# Patient Record
Sex: MALE | Race: WHITE | Employment: FULL TIME | URBAN - METROPOLITAN AREA
[De-identification: names, ages, dates, MRNs, and addresses within clinical notes are randomized per-mention and may not be internally consistent; named-entity substitution may affect disease eponyms.]

---

## 2019-05-06 ENCOUNTER — HOSPITAL ENCOUNTER (OUTPATIENT)
Dept: ULTRASOUND IMAGING | Age: 49
Discharge: HOME | End: 2019-05-06
Attending: FAMILY MEDICINE
Payer: COMMERCIAL

## 2019-05-06 DIAGNOSIS — R79.89 ABNORMAL LIVER FUNCTION TEST: ICD-10-CM

## 2019-05-06 DIAGNOSIS — R78.89 ABNORMAL BLOOD LEVEL OF LITHIUM: ICD-10-CM

## 2019-05-06 PROCEDURE — 76700 US EXAM ABDOM COMPLETE: CPT

## 2020-09-10 ENCOUNTER — HOSPITAL ENCOUNTER (OUTPATIENT)
Dept: ULTRASOUND IMAGING | Age: 50
Discharge: HOME | End: 2020-09-10
Attending: FAMILY MEDICINE
Payer: COMMERCIAL

## 2020-09-10 DIAGNOSIS — R74.8 ELEVATED AMYLASE AND LIPASE: ICD-10-CM

## 2020-09-10 PROCEDURE — 76705 ECHO EXAM OF ABDOMEN: CPT

## 2020-10-11 ENCOUNTER — APPOINTMENT (OUTPATIENT)
Dept: CT IMAGING | Age: 50
End: 2020-10-11
Attending: EMERGENCY MEDICINE
Payer: COMMERCIAL

## 2020-10-11 ENCOUNTER — HOSPITAL ENCOUNTER (EMERGENCY)
Age: 50
Discharge: HOME | End: 2020-10-12
Attending: EMERGENCY MEDICINE
Payer: COMMERCIAL

## 2020-10-11 VITALS
SYSTOLIC BLOOD PRESSURE: 146 MMHG | DIASTOLIC BLOOD PRESSURE: 82 MMHG | HEART RATE: 62 BPM | BODY MASS INDEX: 30.19 KG/M2 | OXYGEN SATURATION: 98 % | RESPIRATION RATE: 16 BRPM | HEIGHT: 71 IN | WEIGHT: 215.61 LBS | TEMPERATURE: 98.3 F

## 2020-10-11 DIAGNOSIS — R51.9 ACUTE NONINTRACTABLE HEADACHE, UNSPECIFIED HEADACHE TYPE: Primary | ICD-10-CM

## 2020-10-11 PROCEDURE — 70450 CT HEAD/BRAIN W/O DYE: CPT

## 2020-10-11 PROCEDURE — 99282 EMERGENCY DEPT VISIT SF MDM: CPT

## 2020-10-12 RX ORDER — TRAMADOL HYDROCHLORIDE 50 MG/1
50 TABLET ORAL
Qty: 12 TAB | Refills: 0 | OUTPATIENT
Start: 2020-10-12 | End: 2020-10-15

## 2020-10-12 RX ORDER — IBUPROFEN 600 MG/1
600 TABLET ORAL
Qty: 20 TAB | Refills: 0 | Status: SHIPPED | OUTPATIENT
Start: 2020-10-12

## 2020-10-12 NOTE — ED PROVIDER NOTES
EMERGENCY DEPARTMENT HISTORY AND PHYSICAL EXAM      Date: 10/11/2020  Patient Name: Diego Villavicencio    History of Presenting Illness     Chief Complaint   Patient presents with   [facility] Referral / Consult     Patient stated that he started having a headache around 0200 this morning. It has been consistent all day and isn't getting better. He stated that he thought he had too much caffeine or that he was grinding his teeth in his sleep. Since it didn't go away, he went to [facility] and was told to come here to be evaluated for stroke. Stroke scale negative. History Provided By: Patient    HPI: Diego Villavicencio, 47 y.o. male with no significant past medical history presents to the ED with chief complaint of right-sided head pain that started this morning at about 2 AM and is been persistent throughout the day. Patient reports that his headache started at 2 AM and he thinks he may have been grinding his teeth late at night. It feels like \"electric shocks on the right upper side of his face. He denies any dental pain. Patient reports that he got both parts of his shingles vaccine earlier this year. He denies any rash. He reported some blurry vision earlier today but states that has resolved. He also reported some numbness and tingling in both of his hands which is also resolved by the time of my exam.  He denies any facial droop or slurred speech. He denies any weakness of his upper or lower extremities. [facility] and [facility] him to the ED for evaluation for possible stroke. PCP: Sowmya Mckeon, DO    No current facility-administered medications on file prior to encounter. No current outpatient medications on file prior to encounter. Past History     Past Medical History:  No past medical history on file. Past Surgical History:  No past surgical history on file. Family History:  No family history on file.     Social History:  Social History     Tobacco Use  Smoking status: Not on file   Substance Use Topics    Alcohol use: Not on file    Drug use: Not on file       Allergies: Allergies   Allergen Reactions    Sulfa (Sulfonamide Antibiotics) Hives         Review of Systems   Review of Systems   Constitutional: Negative for chills and fever. HENT: Negative for congestion, ear pain, rhinorrhea and sore throat. Eyes: Positive for visual disturbance ( Resolved at the time of my interview). Respiratory: Negative for cough, chest tightness, shortness of breath and wheezing. Cardiovascular: Negative for chest pain, palpitations and leg swelling. Gastrointestinal: Negative for diarrhea, nausea and vomiting. Genitourinary: Negative for dysuria, flank pain and hematuria. Musculoskeletal: Negative for back pain and myalgias. Skin: Negative for rash and wound. Neurological: Positive for numbness and headaches. Negative for dizziness, syncope, facial asymmetry, speech difficulty, weakness and light-headedness. Psychiatric/Behavioral: Negative for confusion. The patient is nervous/anxious. All other systems reviewed and are negative.         Physical Exam    General appearance - well nourished, well appearing, and in no distress  Eyes - pupils equal and reactive, extraocular eye movements intact  ENT - mucous membranes moist, pharynx normal without lesions, tender to touch on the right forehead and right upper cheek, TMs clear bilaterally, no vesicular lesions in auditory canal or on scalp behind the hairline  Neck - supple, no significant adenopathy; non-tender to palpation  Chest - clear to auscultation, no wheezes, rales or rhonchi; non-tender to palpation  Heart - normal rate and regular rhythm, S1 and S2 normal, no murmurs noted  Abdomen - soft, nontender, nondistended, no masses or organomegaly  Musculoskeletal - no joint tenderness, deformity or swelling; normal ROM  Extremities - peripheral pulses normal, no pedal edema  Skin - normal coloration and turgor, no rashes  Neurological - alert, oriented x3, normal speech, no focal findings or movement disorder noted    Diagnostic Study Results     Labs -   No results found for this or any previous visit (from the past 12 hour(s)). Radiologic Studies -   CT HEAD WO CONT   Final Result   IMPRESSION:       No acute intracranial abnormality. CT Results  (Last 48 hours)               10/11/20 2254  CT HEAD WO CONT Final result    Impression:  IMPRESSION:        No acute intracranial abnormality. Narrative:  EXAM:  CT HEAD WO CONT       INDICATION: Headache. COMPARISON: None       TECHNIQUE: Noncontrast head CT. Coronal and sagittal reformats. CT dose   reduction was achieved through use of a standardized protocol tailored for this   examination and automatic exposure control for dose modulation. FINDINGS: The ventricles and sulci are age-appropriate without hydrocephalus. There is no mass effect or midline shift. There is no intracranial hemorrhage or   extra-axial fluid collection. There is no abnormal parenchymal attenuation. The   gray-white matter differentiation is maintained. The basal cisterns are patent. The osseous structures are intact. The visualized paranasal sinuses and mastoid   air cells are clear. CXR Results  (Last 48 hours)    None            Medical Decision Making   I am the first provider for this patient. I reviewed the vital signs, available nursing notes, past medical history, past surgical history, family history and social history. Vital Signs-Reviewed the patient's vital signs.   Patient Vitals for the past 12 hrs:   Temp Pulse Resp BP SpO2   10/11/20 2213 98.3 °F (36.8 °C) 62 16 (!) 146/82 98 %           Records Reviewed: Nursing Notes and Old Medical Records    Provider Notes (Medical Decision Making):   Differential diagnosis: Atypical migraine, trigeminal neuralgia, zoster, muscle spasm  We will check head CT given vague neurologic complaints of visual disturbance and tingling in hands which have both resolved. My suspicion for a central process is extremely low given the fact that symptoms involved both eyes and both hands and now have completely resolved. ED Course:   Initial assessment performed. The patients presenting problems have been discussed, and they are in agreement with the care plan formulated and outlined with them. I have encouraged them to ask questions as they arise throughout their visit. Progress Notes:   Head CT is negative. Patient instructed to be on the alert for possible vesicular rash which would suggest zoster. Encouraged to follow-up with PCP and return to ED for worsening symptoms. Disposition:  Discharge home    PLAN:  1. Current Discharge Medication List      START taking these medications    Details   traMADoL (Ultram) 50 mg tablet Take 1 Tab by mouth every six (6) hours as needed for Pain for up to 3 days. Max Daily Amount: 200 mg. Qty: 12 Tab, Refills: 0    Associated Diagnoses: Acute nonintractable headache, unspecified headache type      ibuprofen (MOTRIN) 600 mg tablet Take 1 Tab by mouth every six (6) hours as needed for Pain. Qty: 20 Tab, Refills: 0           2. Follow-up Information     Follow up With Specialties Details Why Contact Info    [facility] EMERGENCY DEPT Emergency Medicine  If symptoms worsen [address] [place] [address]  [phone]    Jordin Bush, DO Family Medicine Schedule an appointment as soon as possible for a visit  [address] [facility] [address] [phone]          Return to ED if worse     Diagnosis     Clinical Impression:   1.  Acute nonintractable headache, unspecified headache type

## 2020-10-12 NOTE — ED NOTES
Provider at the bedside talking with patient to evaluate for stroke. MD is not going to call a code S at this time. She gave a verbal order for CT without contrast to be done.

## 2020-10-12 NOTE — DISCHARGE INSTRUCTIONS

## 2022-03-15 ENCOUNTER — CLINICAL SUPPORT (OUTPATIENT)
Dept: DIABETES SERVICES | Age: 52
End: 2022-03-15
Payer: COMMERCIAL

## 2022-03-15 DIAGNOSIS — E11.65 TYPE 2 DIABETES MELLITUS WITH HYPERGLYCEMIA, UNSPECIFIED WHETHER LONG TERM INSULIN USE (HCC): Primary | ICD-10-CM

## 2022-03-15 PROCEDURE — G0108 DIAB MANAGE TRN  PER INDIV: HCPCS

## 2022-03-18 NOTE — PROGRESS NOTES
[employer] [facility] for Diabetes Health  Diabetes Self-Management Education & Support Program  Pre-program Assessment    Reason for Referral: DSMES  Referral Source: Ralph Tobar DO  Services requested: DSMES and MNT    ASSESSMENT    From my perspective, the participant would benefit from [facility] specifically related to Reducing risks, Healthy eating, Monitoring, Physical activity, Taking medications, Healthy coping and Problem solving. Will adapt DSMES program to build on participant's strengths in skills score, strengths in confidence score and strengths in preparedness score as noted in the Diabetes Skills, Confidence, and Preparedness Index. During the program, we will focus on providing DSMES that specifically addresses participant's interest in Reducing risks, Healthy eating, Monitoring, Physical activity, Taking medications, Healthy coping and Problem solving, as shown by their reported readiness to change. The participant would be best served by attending weekly group class series. Diabetes Self-Management Education Follow-up Visit: 4-6-2022       Clinical Presentation  Nawaf Edwards is a 45 y.o. White male referred for diabetes self-management education. Participant has Type 2 DM on insulin for 1-10 years. Family history positive for diabetes. Patient reports receiving DSMES services in the past. Most recent A1c value: 11.4 on 12/11/2021 per provider referral    Diabetes-related medical history: none reported    Diabetes-related medications:  Current dosing:   Key Antihyperglycemic Medications     Patient is on no antihyperglycemic meds. Mr. Redd Connolly reported taking metformin 500 mg 1 tab twice daily with food and Lantus insulin 22 units daily     Blood Pressure Management  Key ACE/ARB Medications     Patient is on no ACE or ARB meds. Lipid Management  Key Antihyperlipidemia Meds     The patient is on no antihyperlipidemia meds.           Clot Prevention  Key Anti-Platelet Anticoagulant Meds     The patient is on no antiplatelet meds or anticoagulants. Learning Assessment  Learning objectives Educator assessment (3/18/2022)   Diabetes Disease Process  The participant can   A) describe diabetes in basic terms;   B) state the type of diabetes they have; &   C) state accepted blood glucose targets. Healthy Eating  The participant can   A) identify carbohydrate foods; &   B) accurately read food labels. Being Active  The participant can  A) state the benefits of physical activity;  B) report their current PA practices;  C) identify PA they would consider incorporating in their lives; &  D) develop an implementation plan. Monitoring  The participant can  A) operate their blood glucose meter; &  B) describe how they log their blood glucoses to share with their provider. Taking Medications  The participant can  A) name their diabetes medications;  B) state the purpose and dose;  C) note side effects; &  D) describe proper storage, disposal & transport (if appropriate). Healthy Coping  The participant can    A) describe their response to diabetes diagnosis; B) describe their specific coping mechanisms;  C) identify supportive people and/or other resources that positively support their diabetes self-care and health. Reducing Risks  The participant can describe the preventive measures used by providers to promote health and prevent diabetes complications. Problem Solving  The participant can   A) identify signs, symptoms & treatment of hypoglycemia;   B) identify signs, symptoms & treatment of hyperglycemia;  C) describe their sick day plan; &  D) identify BG patterns to discuss with their provider.        Yes  Yes  No        Yes  Yes        Yes  Yes  Yes  Yes        Yes  Yes          Yes  Yes  Yes  Yes      Yes  Yes  Yes        No          Yes  Yes  No  No     Characteristics to Learning   Barriers to Learning   [] Cognitive loss  [] Mental retardation   [] Intellectual delay/cognitive impairment  [] Psychiatric disorder  [] Visually impaired  [] Hearing loss                 [] Low literacy (difficulty with written text)  [] Low numeracy (difficulty with mathematical information  [] Low health literacy (difficulty with understanding health information & services  [] Language  [] Functional limitation  [] Pain   [] Financial  [] Transportation  [x] None   Favorite Ways to Learn   [] Lecture  [] Slides  [] Reading [] Video-Internet  [] Cassettes/CDs/MP3's  [x] Interactive Small Groups [] Other       Behavioral Assessment  Current self-care practices  Educator assessment (3/18/2022)   Healthy Eating  Current practices  24-hour Dietary Recall:  Breakfast: scrambled eggs, Steve's bread  Lunch: apple, blueberries, ham and cheese croissant   Dinner: cheese and crackers  Snacks: none  Beverages: coffee, water, diet soda  Alcohol: diet coke with a splash of rum     Would benefit from DSMES related to Healthy Eating: Yes      Eats a carbohydrate controlled diet: No      Stage of change: Preparation   Being Active  Current practices  How many days during the past week have you performed physical activity where your heart beats faster and your breathing is harder than normal for 30 minutes or more? 2 days    How many days in a typical week do you perform activity such as this?  2 days     Would benefit from [facility] related to Being Active: Yes      Exercises 150 minutes/week: Yes      Stage of change: Action    Mr. Inder López reported biking about 100 miles on the weekends and/or walking about an hour daily at the park. Monitoring  Current practices  Do you monitor your blood sugar? Yes    How often do you monitor? 1-2x/day    What are the range of readings? 157-344 mg/dL    Do you know your last A1c measurement? Yes    Do you know the meaning of the A1c?  Yes     Would benefit from [facility] related to Monitoring: Yes      Uses BG readings to establish trends and understand BG patterns: No      Stage of change: Preparation   Taking Medication  Current practices  Do you understand what your diabetes medications do? Yes    How often do you miss doses of your diabetes medications? sometimes misses diabetes medications    Can you afford your diabetes medications? Yes   Would benefit from [facility] related to Taking Medication: Yes      Takes medications consistently to receive full benefit: Yes      Stage of change: Action       Healthy Coping   Current state  Diabetes Skills, Confidence and Preparedness Index: Total score: 6.0  Skills: 5.7  Confidence: 5.6  Preparedness: 7.0   Would benefit from DSMES related to Healthy Coping: Yes      Identifies specific people, organizations,etc, that actively support their diabetes self-care efforts: Yes      Stage of change: Preparation     Reducing Risks  Current state  Vaccines:  Influenza: There is no immunization history on file for this patient. Pneumococcal:   There is no immunization history on file for this patient. Hepatitis:   There is no immunization history on file for this patient. Examinations:  No Diabetic HM Topics for this patient     Dental exam: Last appointment was: participant reported 3/2022    Foot exam: Last appointment was: participant reported 3/2022    Heart Protection:  BP Readings from Last 2 Encounters:   10/11/20 (!) 146/82        No results found for: LDL, LDLC, DLDLP     Kidney Protection:  No results found for: MCACR, MCA1, MCA2, MCA3, MCAU, MCAU2, MCALPOCT     Would benefit from [facility] related to Reducing Risks: Yes      Actively participates in decision-making with provider regarding secondary prevention:  Yes      Stage of change: Preparation   Problem Solving  Current state  Hypoglycemia Management:  What are signs and symptoms of hypoglycemia that you experience? Sweat/clammy skin, Nausea/queasiness    How do you prevent hypoglycemia?  Consistent meals/snack times    How do you treat hypoglycemia? eat cheese or milk    Hyperglycemia Management:  What are signs and symptoms of hyperglycemia that you experience? Extreme thirst, Frequent urination    How can you prevent hyperglycemia? Take medication as instructed, Focus on carbohydrate counting/meal planning    Sick Day Management:  What do you do differently on sick days? Pt reported being unaware of self-management on sick days    Pattern Management:  Do you notice blood glucose patterns when you look at the readings in your meter or logbook? No    How do you use the blood glucose readings from your meter or logbook? Pt reported being unaware of pattern management skills     Would benefit from [facility] related to Problem Solving: Yes      Articulates appropriate strategies to address hypoglycemia, hyperglycemia, sick day care and BG pattern: No      Stage of change: Preparation     Note: Content derived from the American Association of Diabetes Educators' Diabetes Education Curriculum: A Guide to Successful Self-Management (3rd edition)      Barbara Dugan RN on 3/18/2022 at 8:13 AM    I have personally reviewed the health record, including provider notes, laboratory data and current medications before making these care and education recommendations. The time spent in this effort is included in the total time. Total minutes: 48    Diabetes Skills, Confidence & Preparedness Index (SCPI) ©  Thank you for completing the Skills, Confidence & Preparedness Index! Below are your scores. All scales and questions are out of 7. If you would like these results emailed, please enter your email address along with some identifying patient information.   Email:    Patient Identifier:   Overall SCPI score: 6.0 Skills Score: 5.7  Low: Blood Sugar Monitoring(Q8) Confidence Score: 5.6  Low: Healthy Coping(Q2),Problem MDOXKFH(W4) Preparedness Score: 7.0  Low: Healthy Eating(Q1),Being Active(Q2),Healthy Coping(Q3),Reducing Risks(Q4),Taking Medication(Q5),Blood Sugar Monitoring(Q6),Problem ZXSENTHIL(I8)  Healthy Eating Score: 6.5  Low: Confidence(Q4) Taking Medication Score: 7.0  Low: Skills(Q2),Preparedness(Q5) Blood Sugar Monitoring Score: 6.2  Low: Skills(Q8) Reducing Risks Score: 5.8  Low: BPBBMD(R6)  Problem Solving Score: 5.3  Low: Confidence(Q7) Healthy Coping Score: 5.0  Low: Skills(Q7),Confidence(Q2) Being Active Score: 7.0  Low: Confidence(Q5),Preparedness(Q2)  Skills/Knowledge Questions  1. I know how to plan meals that have the best balance between carbohydrates, proteins and vegetables. 7  2. I know how my diabetes medications (pills, injectables and/or insulin) work in my body. 7  3. I know when to check my blood sugar if I want to see how my body responded to a meal. 7  4. I know when to check my blood sugars to determine if my medication or insulin doses are correct. 7  5. I know what to do to prevent a low blood sugar when I exercise (either before, during, or after). 7  6. When I am sick, I know what to do differently with my diabetes management. 5  7. I know how stress can affect my diabetes management. 4  8. When I look at my blood sugars over a given week, I can explain what my blood sugar pattern is. 3  9. I know what my target levels are for A1c, blood pressure and cholesterol. 4  Confidence Questions  1. I am confident that I can plan balanced meals and snacks. 7  2. I am confident that I can manage my stress. 4  3. I am confident that I can prevent a low blood sugar during or after exercise. 5  4. I am confident that the next time I eat out, I will be able to choose foods that best keep my blood sugars in target. 5  5. I am confident I can include exercise into my schedule. 7  6. I am confident that I can use my daily blood sugars to adjust my diet, my activity, and/or my insulin. 7  7. When something out of my normal routine happens, I am confident that I can problem-solve and keep my diabetes on track. 4  Preparedness Questions  1.  Within the next month, I will begin to eat more balanced meals and snacks. 8  2. Within the next month, I will choose an exercise activity and I will start fitting it into my schedule. 8  3. Within the next month, I will make a list of stress management options that work for me. 8  4. Within the next month, I will consistently plan ahead to prevent low blood sugars. 8  5. Within the next month, I will start adjusting my insulin doses on my own. 8  6. Within the next month, I will begin making changes to my diabetes management based on my daily blood sugars (eg - eating, activity and/or insulin). 8  7. Within the next month, I will begin making changes to my diabetes management to meet my overall goals (eg - eating, activity and/or insulin).  8

## 2022-04-06 ENCOUNTER — CLINICAL SUPPORT (OUTPATIENT)
Dept: DIABETES SERVICES | Age: 52
End: 2022-04-06
Payer: COMMERCIAL

## 2022-04-06 DIAGNOSIS — E11.65 TYPE 2 DIABETES MELLITUS WITH HYPERGLYCEMIA, UNSPECIFIED WHETHER LONG TERM INSULIN USE (HCC): Primary | ICD-10-CM

## 2022-04-06 PROCEDURE — G0109 DIAB MANAGE TRN IND/GROUP: HCPCS | Performed by: DIETITIAN, REGISTERED

## 2022-04-07 NOTE — PROGRESS NOTES
[address] [place] [address] for Diabetes Health  Diabetes Self-Management Education & Support Program  Encounter Note  Class 1        SUMMARY  Diabetes self-care management training was completed related to Reducing risks. The participant will return in one week to continue DSMES related to Healthy eating and Monitoring. The participant did not identify a SMART Goal today and will practice knowledge and skills related to reducing risks to improve diabetes self-management. EVALUATION:  Twin Martínez actively participated in group class discussions. RECOMMENDATIONS:  Communicate with provider regarding recommendations for immunizations/vaccines. Twin Martínez is willing to establish a personal health care record to track their diabetes management. Next provider visit is scheduled for 1 week         DATE DSMES TOPIC EVALUATION     4/6/2022 WHAT IS DIABETES?   a. Role of the normal pancreas in energy balance and blood glucose control   b. The defect seen in diabetes   c. Signs & symptoms of diabetes   d. Diagnosis of diabetes   e. Types of diabetes   f. Blood glucose targets in pregnant & non-pregnant adults       The participant knows   Their type of diabetes Yes   The basic physiologic defect Yes   Blood glucose targets yes     DATE DSMES TOPIC EVALUATION     4/6/2022 HOW DO I STAY HEALTHY?   a. Prevention    Vaccinations    Preconception care (if applicable)  b. Examinations    Eye     Dental   Foot   c. Diabetic complications' prevention    Heart health    Kidney Health    Nerve health    Sleep health      The participant has a personal diabetes care record to keep abreast of diabetes health YES    The participant would benefit from: Communicate with provider regarding recommended immunizations/vaccines.                    Gagandeep Dior RD on 4/7/2022 at 2:56 PM      I have personally reviewed the health record, including provider notes, laboratory data and current medications before making these care and education recommendations. The time spent in this effort is included in the total time.   Total minutes: 125

## 2022-04-08 ENCOUNTER — TELEPHONE (OUTPATIENT)
Dept: DIABETES SERVICES | Age: 52
End: 2022-04-08

## 2022-04-13 ENCOUNTER — CLINICAL SUPPORT (OUTPATIENT)
Dept: DIABETES SERVICES | Age: 52
End: 2022-04-13
Payer: COMMERCIAL

## 2022-04-13 DIAGNOSIS — E11.65 TYPE 2 DIABETES MELLITUS WITH HYPERGLYCEMIA, UNSPECIFIED WHETHER LONG TERM INSULIN USE (HCC): Primary | ICD-10-CM

## 2022-04-13 PROCEDURE — G0109 DIAB MANAGE TRN IND/GROUP: HCPCS

## 2022-04-14 ENCOUNTER — TELEPHONE (OUTPATIENT)
Dept: DIABETES SERVICES | Age: 52
End: 2022-04-14

## 2022-04-14 NOTE — PROGRESS NOTES
[facility] Program for Diabetes Health  Diabetes Self-Management Education & Support Program  Encounter note    SUMMARY  Diabetes self-care management education was completed related to healthy eating and monitoring. Rita Malhotra will return in 1 week to continue DSMES related to physical activity and taking medications. Rita Malhotra did identify a SMART Goal(s): increase nutrition by limiting diet sodas to 7 per day everyday this week and will practice knowledge and skills related to healthy eating and monitoring to improve diabetes self-management. EVALUATION:  Rita Malhotra actively engaged in class discussions and demonstrations including: healthy plate method utilizing food models, carbohydrate counting, reading food labels, monitoring BG techniques, recommended BG targets, CGM technology, recording and communicating BG results with providers. Rita Malhotra reported BG range: 165 - 175 mg/dL. RECOMMENDATIONS:  Rita Malhotra is willing to: work on his SMART goal  Rita Malhotra is also willing to practice building healthy meals following ADA guidelines, practice reading food labels, and will monitor and record BG results to share with provider. SMART GOAL(S)  1. Increase Nutrition by limiting diet sodas to 7 per day, everyday this week  ACHIEVEMENT OF GOAL(S)  [x] 0-24%     [] 25-49%     [] 50-74%     [] %     DATE DSMES TOPIC EVALUATION     4/13/2022 WHAT CAN I EAT?   a. General principles   b. Determining a healthy weight   c. Nutritional terms & tools    Healthy Plate method    Carbohydrate Counting    Reading food labels    Free apps   d. Pregnancy recommendations      The participant    Used Healthy Plate principles in constructing meals Yes   Read food labels in choosing acceptable foods Yes    The participant would benefit from:  Practice building healthy meals daily following ADA guidelines. Practice reading food labels.      DATE DSMES TOPIC EVALUATION     4/13/2022 HOW CAN BLOOD GLUCOSE MONITORING HELP ME?   a. Value of blood glucose monitoring   b. Realistic expectations   c. Blood glucose monitoring targets   d. Target adjustments   e. Setting a1c & blood glucose targets with provider   f. Meter selection    g. Technique for obtaining blood droplet   h. Blood glucose testing sites   i. Determining best times to test   j. Pregnancy recommendations   k. Data sharing with provider        The participant    Can demonstrate their glucometer procedure Yes   Logs their BG readings Yes    The participant would benefit from:  Monitor and record BG and communicate results with provider. I have personally reviewed the health record, including provider notes, laboratory data and current medications before making these care and   education recommendations. The time spent in this effort is included in the total time.   Total minutes: 120

## 2022-04-14 NOTE — TELEPHONE ENCOUNTER
Mr. Isra Lagunas called with concerns regarding ADA recommended vaccines and immunizations. Discussed ADA recommendations as pertains to persons with diabetes and encouraged communication with provider before obtaining vaccines/immunizations from pharmacy.

## 2022-04-20 ENCOUNTER — CLINICAL SUPPORT (OUTPATIENT)
Dept: DIABETES SERVICES | Age: 52
End: 2022-04-20
Payer: COMMERCIAL

## 2022-04-20 DIAGNOSIS — E11.65 TYPE 2 DIABETES MELLITUS WITH HYPERGLYCEMIA, UNSPECIFIED WHETHER LONG TERM INSULIN USE (HCC): Primary | ICD-10-CM

## 2022-04-20 PROCEDURE — G0109 DIAB MANAGE TRN IND/GROUP: HCPCS | Performed by: DIETITIAN, REGISTERED
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
## 2022-04-25 ENCOUNTER — OFFICE VISIT (OUTPATIENT)
Dept: ORTHOPEDIC SURGERY | Age: 52
End: 2022-04-25
Payer: COMMERCIAL

## 2022-04-25 VITALS — WEIGHT: 210 LBS | BODY MASS INDEX: 29.4 KG/M2 | HEIGHT: 71 IN

## 2022-04-25 DIAGNOSIS — S83.241A ACUTE MEDIAL MENISCUS TEAR, RIGHT, INITIAL ENCOUNTER: Primary | ICD-10-CM

## 2022-04-25 PROCEDURE — 99204 OFFICE O/P NEW MOD 45 MIN: CPT | Performed by: ORTHOPAEDIC SURGERY

## 2022-04-25 RX ORDER — ATORVASTATIN CALCIUM 20 MG/1
20 TABLET, FILM COATED ORAL DAILY
COMMUNITY
Start: 2022-02-23

## 2022-04-25 RX ORDER — FINASTERIDE 5 MG/1
5 TABLET, FILM COATED ORAL 2 TIMES DAILY
COMMUNITY
Start: 2022-02-23

## 2022-04-25 RX ORDER — AMITRIPTYLINE HYDROCHLORIDE 25 MG/1
25 TABLET, FILM COATED ORAL
COMMUNITY
Start: 2022-02-14

## 2022-04-25 RX ORDER — INSULIN GLARGINE 100 [IU]/ML
INJECTION, SOLUTION SUBCUTANEOUS
COMMUNITY
Start: 2022-02-20

## 2022-04-25 RX ORDER — DICLOFENAC SODIUM 75 MG/1
75 TABLET, DELAYED RELEASE ORAL 2 TIMES DAILY
Qty: 60 TABLET | Refills: 0 | Status: SHIPPED | OUTPATIENT
Start: 2022-04-25
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
## 2022-04-25 NOTE — LETTER
4/25/2022    Patient: Ale Ware   YOB: 1970   Date of Visit: 4/25/2022     Wilberto Bacon  [facility] [address]  Via Fax: [phone]    Dear Taz Nicole DO,      Thank you for referring Mr. Ale Ware to [facility] for evaluation. My notes for this consultation are attached. If you have questions, please do not hesitate to call me. I look forward to following your patient along with you.       Sincerely,    Carine Felipe MD

## 2022-04-27 ENCOUNTER — CLINICAL SUPPORT (OUTPATIENT)
Dept: DIABETES SERVICES | Age: 52
End: 2022-04-27
Payer: COMMERCIAL

## 2022-04-27 DIAGNOSIS — E11.65 TYPE 2 DIABETES MELLITUS WITH HYPERGLYCEMIA, UNSPECIFIED WHETHER LONG TERM INSULIN USE (HCC): Primary | ICD-10-CM

## 2022-04-27 PROCEDURE — G0109 DIAB MANAGE TRN IND/GROUP: HCPCS

## 2022-04-28 NOTE — PROGRESS NOTES
[facility] Program for Diabetes Health  Diabetes Self-Management Education & Support Program  Encounter note    SUMMARY  Diabetes self-care management training was completed related to Healthy coping and Problem solving. [facility]. The participant will practice knowledge and skills related to healthy eating, healthy coping and problem solving to improve diabetes self-management. EVALUATION:  Flaquito De Leon actively participated in group class discussions. Discussed healthy coping, problem solving techniques, obtaining support, and stress management options. Discussed and demonstrated emergency preparedness kit/plan. RECOMMENDATIONS:  Flaquito De Leon is willing to continue to work on SMART goal.     Next provider visit is scheduled for 5/3/22     SMART GOAL(S)  1. Increase Nutrition by limiting diet sodas to 7 per day, everyday this week  ACHIEVEMENT OF GOAL(S)  [] 0-24%     [x] 25-49%     [] 50-74%     [x] %     DATE [facility]     4/28/2022 HOW DO I FIND SUPPORT TO TACKLE THIS CONDITION?   a. Normal responses to diabetes diagnosis or complication    Shock    Anger & resentment    Guilt/self-blame    Sadness & worry    Depression     Anxiety    Pregnancy   b. Constructive strategies to normal responses     Exploring feelings & attitudes    Motivation: Cost versus benefits analysis    Problem-solving: Chain analysis    Obtaining support: Communicating   i. Family & friends   ii. Health team   iii.  Community   c. Stress    Symptoms    Managing stress    Fill your tool kit        The participant can identify people that support them in caring for their diabetes health:  YES    The participant would like to pursue the following in keeping themselves healthy after completing the program:  Websites, friends, counselor behavioral health, support groups    The participant would benefit from:  Practicing stress management techniques     DATE DSMES TOPIC EVALUATION     4/28/2022 HOW DO I FIGURE OUT WHAT'S INFLUENCING MY BLOOD GLUCOSES?   a. Problem solving using SOAR    Set goals    Sort options    Arrive at a plan    Reaffirm plan   b. Common problems in diabetes self-care    Hypoglycemia    Hyperglycemia    Sick days   c. Pattern management   d. Disaster preparedness plan        The participant has an action plan for    Hypoglycemia Yes   Hyperglycemia Yes   Sick days Yes   During disasters Yes    The participant would benefit from:  Practicing problem solving techniques     Tata Casper RN on 4/28/2022 at 1:06 PM    I have personally reviewed the health record, including provider notes, laboratory data and current medications before making these care and education recommendations. The time spent in this effort is included in the total time.   Total minutes: 120

## 2022-05-03 ENCOUNTER — HOSPITAL ENCOUNTER (OUTPATIENT)
Dept: MRI IMAGING | Age: 52
Discharge: HOME | End: 2022-05-03
Attending: ORTHOPAEDIC SURGERY
Payer: COMMERCIAL

## 2022-05-03 DIAGNOSIS — S83.241A ACUTE MEDIAL MENISCUS TEAR, RIGHT, INITIAL ENCOUNTER: ICD-10-CM

## 2022-05-03 PROCEDURE — 73721 MRI JNT OF LWR EXTRE W/O DYE: CPT

## 2022-05-09 ENCOUNTER — OFFICE VISIT (OUTPATIENT)
Dept: ORTHOPEDIC SURGERY | Age: 52
End: 2022-05-09

## 2022-05-09 VITALS — BODY MASS INDEX: 29.4 KG/M2 | HEIGHT: 71 IN | WEIGHT: 210 LBS

## 2022-05-09 DIAGNOSIS — S83.241A ACUTE MEDIAL MENISCUS TEAR, RIGHT, INITIAL ENCOUNTER: Primary | ICD-10-CM

## 2022-05-09 DIAGNOSIS — S83.511A COMPLETE TEAR OF RIGHT ACL, INITIAL ENCOUNTER: ICD-10-CM

## 2022-05-09 DIAGNOSIS — S83.411A TEAR OF MCL (MEDIAL COLLATERAL LIGAMENT) OF KNEE, RIGHT, INITIAL ENCOUNTER: ICD-10-CM

## 2022-05-09 PROCEDURE — 99214 OFFICE O/P EST MOD 30 MIN: CPT | Performed by: ORTHOPAEDIC SURGERY

## 2022-05-09 RX ORDER — MELOXICAM 15 MG/1
15 TABLET ORAL DAILY
Qty: 30 TABLET | Refills: 3 | Status: SHIPPED | OUTPATIENT
Start: 2022-05-09 | End: 2022-05-25

## 2022-05-09 NOTE — PATIENT INSTRUCTIONS
What to expect after ACL Reconstruction   Dr. Jaclyn Rene should not have ANYTHING to eat or drink after midnight the night before your surgery.  This includes NO no gum, mints, candy, lifesavers or lollipops!  Please make sure to remove ALL jewelry.  When you arrive at the hospital or surgery center, you will be checked in and given an IV.  You will be offered a nerve block, which I do recommend. This will be done pre-operatively by the anesthesiologist. It is an injection around your upper thigh that numbs the nerves to your leg and lasts 15  20 hours. This will give you pain relief that hopefully lasts throughout your first night.  When the nerve block wears off, you will likely be in pain. This can range from mild to severe. If you experience severe pain, this is still normal. Nothing is wrong. You would have woke up with worse pain if you did not have the nerve block!  During first three days, the pain is usually the worst, and then gradually subsides after that.  Numbness, tingling, soreness and bruising are all normal    Your knee may also be warm to touch for the first few days.  You may also experience swelling in the leg, ankle and foot. This is normal. I recommend ice and elevation    You will likely continue to have pain for several weeks or even months. Recovery from knee surgery takes several months. BE PATIENT!  You may be weight bearing as tolerated in your brace   At your first follow up appointment, you will have your sutures removed and will be given a script for physical therapy   You will be in therapy for about 8 weeks  12 weeks.  Driving:  o If you had surgery on your LEFT knee, you can begin driving when you are no longer taking narcotic pain medications.   o If you had surgery on your RIGHT knee, you should figure on starting to drive at 5 weeks post-op  - If you had a meniscal repair, this will be 6 weeks post-op   Your restrictions will be as follows: (unless otherwise directed by your physician)  o NO lifting/carrying /pushing/pulling greater than 10 lbs for 6 months  o NO kneeling/crawling/climbing  o You may begin to run at 3 months post-op   o In sports: No cutting/pivoting for 6 months  o Return to sports: 6-8 months depending on the sport  o

## 2022-05-09 NOTE — LETTER
5/9/2022    Patient: Yuridia Davis   YOB: 1970   Date of Visit: 5/9/2022     [address]  [address] [address]  Via Fax: [phone]     Marques Dalton, [address] [place] [address]  Via In Basket    Dear DO Marques Grider MD,      Thank you for referring Mr. Yuridia Davis to [facility] for evaluation. My notes for this consultation are attached. If you have questions, please do not hesitate to call me. I look forward to following your patient along with you.       Sincerely,    Kirstie Benavidez, DO
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
## 2022-05-11 ENCOUNTER — TELEPHONE (OUTPATIENT)
Dept: ORTHOPEDIC SURGERY | Age: 52
End: 2022-05-11

## 2022-05-11 DIAGNOSIS — S83.511A COMPLETE TEAR OF RIGHT ACL, INITIAL ENCOUNTER: ICD-10-CM

## 2022-05-11 DIAGNOSIS — S83.411A TEAR OF MCL (MEDIAL COLLATERAL LIGAMENT) OF KNEE, RIGHT, INITIAL ENCOUNTER: Primary | ICD-10-CM

## 2022-05-11 DIAGNOSIS — S83.241A ACUTE MEDIAL MENISCUS TEAR, RIGHT, INITIAL ENCOUNTER: ICD-10-CM

## 2022-05-16 RX ORDER — FLASH GLUCOSE SENSOR
KIT MISCELLANEOUS
COMMUNITY
Start: 2022-04-24

## 2022-05-16 RX ORDER — UBIDECARENONE 30 MG
30 CAPSULE ORAL
COMMUNITY

## 2022-05-16 RX ORDER — TESTOSTERONE 10 MG/G
GEL TOPICAL
COMMUNITY
Start: 2022-02-28

## 2022-05-16 RX ORDER — METHOCARBAMOL 500 MG/1
500 TABLET, FILM COATED ORAL 4 TIMES DAILY
COMMUNITY

## 2022-05-16 RX ORDER — METHOCARBAMOL 500 MG/1
TABLET, FILM COATED ORAL
COMMUNITY
Start: 2022-04-18 | End: 2022-05-16

## 2022-05-16 RX ORDER — ACETAMINOPHEN 500 MG
1000 TABLET ORAL
COMMUNITY

## 2022-05-16 RX ORDER — ASPIRIN 81 MG/1
81 TABLET ORAL DAILY
COMMUNITY

## 2022-05-16 RX ORDER — EZETIMIBE 10 MG/1
10 TABLET ORAL DAILY
COMMUNITY

## 2022-05-16 NOTE — PERIOP NOTES
[facility]  Ambulatory Surgery Unit  Pre-operative Instructions    Surgery/Procedure Date  Wednesday May 25th            Tentative Arrival Time TBD      1. On the day of your surgery/procedure, please report to the Ambulatory Surgery Unit Registration Desk and sign in at your designated time. The Ambulatory Surgery Unit is located in [facility] on the [facility] side of the [facility] across from the [address] [place] [address]. Please have all of your health insurance cards and a photo ID.    **TWO adults may accompany you the day of the procedure. We have limited seating available. If our waiting room is at capacity, your ride may be asked to remain in their vehicle. No one under 15 is allowed in the waiting room. Masks, fully covering the mouth and nose, are required in the waiting room. 2. You must have someone with you to drive you home, as you should not drive a car for 24 hours following anesthesia. Please make arrangements for a responsible adult friend or family member to stay with you for at least the first 24 hours after your surgery. 3. Do not have anything to eat or drink (including water, gum, mints, coffee, juice) after 11:59 PM  Tuesday May 24th. This may not apply to medications prescribed by your physician. (Please note below the special instructions with medications to take the morning of surgery, if applicable.)    4. We recommend you do not drink any alcoholic beverages for 24 hours before and after your surgery. 5. Contact your surgeons office for instructions on the following medications: non-steroidal anti-inflammatory drugs (i.e. Advil, Aleve), vitamins, and supplements. (Some surgeons will want you to stop these medications prior to surgery and others may allow you to take them)   **If you are currently taking Plavix, Coumadin, Aspirin and/or other blood-thinning agents, contact your surgeon for instructions. ** Your surgeon will partner with the physician prescribing these medications to determine if it is safe to stop or if you need to continue taking. Please do not stop taking these medications without instructions from your surgeon. 6. In an effort to help prevent surgical site infection, we ask that you shower with an anti-bacterial soap (i.e. Dial/Safeguard, or the soap provided to you at your preadmission testing appointment) for 3 days prior to and on the morning of surgery, using a fresh towel after each shower. (Please begin this process with fresh bed linens.) Do not apply any lotions, powders, or deodorants after the shower on the day of your procedure. If applicable, please do not shave the operative site for 48 hours prior to surgery. 7. Wear comfortable clothes. Wear glasses instead of contacts. Do not bring any jewelry or money (other than copays or fees as instructed). Do not wear make-up, particularly mascara, the morning of your surgery. Do not wear nail polish, particularly if you are having foot /hand surgery. Wear your hair loose or down, no ponytails, buns, yanet pins or clips. All body piercings must be removed. 8. You should understand that if you do not follow these instructions your surgery may be cancelled. If your physical condition changes (i.e. fever, cold or flu) please contact your surgeon as soon as possible. 9. It is important that you be on time. If a situation occurs where you may be late, or if you have any questions or problems, please call [phone].    10. Your surgery time may be subject to change. You will receive a phone call the day prior to surgery to confirm your arrival time. 11. Pediatric patients: please bring a change of clothes, diapers, bottle/sippy cup, pacifier, etc.      Special Instructions:     Take all medications and inhalers, as prescribed, on the morning of surgery with a sip of water EXCEPT: diabetic medications      Insulin Dependent Diabetic patients: Take your diabetic medications as prescribed the day before surgery. Hold all diabetic medications the day of surgery. If you are scheduled to arrive for surgery after 8:00 AM, and your AM blood sugar is >200, please call Ambulatory Surgery. I understand a pre-operative phone call will be made to verify my surgery time. In the event that I am not available, I give permission for a message to be left on my answering service and/or with another person? Yes    Patient requested instructions to be emailed to him at [facility]. [email or web address]  Instructions scanned and sent to email per pt request         ___________________      ___________________      ________________  (Signature of Patient)          (Witness)                   (Date and Time)

## 2022-05-24 ENCOUNTER — ANESTHESIA EVENT (OUTPATIENT)
Dept: SURGERY | Age: 52
End: 2022-05-24
Payer: COMMERCIAL

## 2022-05-24 NOTE — PERIOP NOTES
Patient called requesting arrival time, per pre op arrival time is 1345. Notified patient and reviewed instructions and medications as well from initial phone call. [phone] - patient called, asking about what else he may need to do before surgery, asking about how long procedure will be, asking for temporary handicap sign for vehicle. Explained to patient what needs to do and, made aware that if everything goes on time and surgeon is on time, procedure will be 1.5 hours and then give or take 1 hour in recovery room and that person coming with patient will need to stay for entire length of procedure. Explained that handicap sign will need to come from an order from Dr Bolye Client and pt would need to go to [facility] for that.

## 2022-05-25 ENCOUNTER — ANESTHESIA (OUTPATIENT)
Dept: SURGERY | Age: 52
End: 2022-05-25
Payer: COMMERCIAL

## 2022-05-25 ENCOUNTER — HOSPITAL ENCOUNTER (OUTPATIENT)
Age: 52
Setting detail: OUTPATIENT SURGERY
Discharge: HOME | End: 2022-05-25
Attending: ORTHOPAEDIC SURGERY | Admitting: ORTHOPAEDIC SURGERY
Payer: COMMERCIAL

## 2022-05-25 VITALS
DIASTOLIC BLOOD PRESSURE: 84 MMHG | BODY MASS INDEX: 29.51 KG/M2 | OXYGEN SATURATION: 94 % | HEART RATE: 69 BPM | TEMPERATURE: 97.6 F | WEIGHT: 210.8 LBS | HEIGHT: 71 IN | SYSTOLIC BLOOD PRESSURE: 127 MMHG | RESPIRATION RATE: 15 BRPM

## 2022-05-25 DIAGNOSIS — Z98.890 STATUS POST ARTHROSCOPY OF RIGHT KNEE: Primary | ICD-10-CM

## 2022-05-25 DIAGNOSIS — S83.511D RUPTURE OF ANTERIOR CRUCIATE LIGAMENT OF RIGHT KNEE, SUBSEQUENT ENCOUNTER: ICD-10-CM

## 2022-05-25 DIAGNOSIS — S83.411D COMPLETE TEAR OF MCL OF KNEE, RIGHT, SUBSEQUENT ENCOUNTER: ICD-10-CM

## 2022-05-25 LAB
GLUCOSE BLD STRIP.AUTO-MCNC: 159 MG/DL (ref 65–117)
GLUCOSE BLD STRIP.AUTO-MCNC: 179 MG/DL (ref 65–117)
SERVICE CMNT-IMP: ABNORMAL
SERVICE CMNT-IMP: ABNORMAL

## 2022-05-25 PROCEDURE — 77030006674 HC BLD MYRIN GYRS -B: Performed by: ORTHOPAEDIC SURGERY

## 2022-05-25 PROCEDURE — 74011250636 HC RX REV CODE- 250/636: Performed by: NURSE ANESTHETIST, CERTIFIED REGISTERED

## 2022-05-25 PROCEDURE — 76060000063 HC AMB SURG ANES 1.5 TO 2 HR: Performed by: ORTHOPAEDIC SURGERY

## 2022-05-25 PROCEDURE — 2709999900 HC NON-CHARGEABLE SUPPLY: Performed by: ORTHOPAEDIC SURGERY

## 2022-05-25 PROCEDURE — 74011250636 HC RX REV CODE- 250/636: Performed by: ANESTHESIOLOGY

## 2022-05-25 PROCEDURE — 77030002916 HC SUT ETHLN J&J -A: Performed by: ORTHOPAEDIC SURGERY

## 2022-05-25 PROCEDURE — 77030006884 HC BLD SHV INCIS S&N -B: Performed by: ORTHOPAEDIC SURGERY

## 2022-05-25 PROCEDURE — 77030019908 HC STETH ESOPH SIMS -A: Performed by: ANESTHESIOLOGY

## 2022-05-25 PROCEDURE — C1713 ANCHOR/SCREW BN/BN,TIS/BN: HCPCS | Performed by: ORTHOPAEDIC SURGERY

## 2022-05-25 PROCEDURE — 74011000250 HC RX REV CODE- 250: Performed by: NURSE ANESTHETIST, CERTIFIED REGISTERED

## 2022-05-25 PROCEDURE — 77030020268 HC MISC GENERAL SUPPLY: Performed by: ORTHOPAEDIC SURGERY

## 2022-05-25 PROCEDURE — 77030031139 HC SUT VCRL2 J&J -A: Performed by: ORTHOPAEDIC SURGERY

## 2022-05-25 PROCEDURE — 77030018834: Performed by: ORTHOPAEDIC SURGERY

## 2022-05-25 PROCEDURE — 77030018787: Performed by: ORTHOPAEDIC SURGERY

## 2022-05-25 PROCEDURE — 76210000050 HC AMBSU PH II REC 0.5 TO 1 HR: Performed by: ORTHOPAEDIC SURGERY

## 2022-05-25 PROCEDURE — 77030026438 HC STYL ET INTUB CARD -A: Performed by: ANESTHESIOLOGY

## 2022-05-25 PROCEDURE — 74011000250 HC RX REV CODE- 250: Performed by: ORTHOPAEDIC SURGERY

## 2022-05-25 PROCEDURE — 27405 REPAIR OF KNEE LIGAMENT: CPT | Performed by: ORTHOPAEDIC SURGERY

## 2022-05-25 PROCEDURE — 77030002922 HC SUT FBRWRE ARTH -B: Performed by: ORTHOPAEDIC SURGERY

## 2022-05-25 PROCEDURE — 29888 ARTHRS AID ACL RPR/AGMNTJ: CPT | Performed by: ORTHOPAEDIC SURGERY

## 2022-05-25 PROCEDURE — 74011250636 HC RX REV CODE- 250/636: Performed by: REGISTERED NURSE

## 2022-05-25 PROCEDURE — 76030000020 HC AMB SURG 1.5 TO 2 HR INTENSV-TIER 1: Performed by: ORTHOPAEDIC SURGERY

## 2022-05-25 PROCEDURE — 76210000034 HC AMBSU PH I REC 0.5 TO 1 HR: Performed by: ORTHOPAEDIC SURGERY

## 2022-05-25 PROCEDURE — 77030000032 HC CUF TRNQT ZIMM -B: Performed by: ORTHOPAEDIC SURGERY

## 2022-05-25 PROCEDURE — 74011000250 HC RX REV CODE- 250: Performed by: REGISTERED NURSE

## 2022-05-25 PROCEDURE — 74011250636 HC RX REV CODE- 250/636: Performed by: ORTHOPAEDIC SURGERY

## 2022-05-25 PROCEDURE — 77030008684 HC TU ET CUF COVD -B: Performed by: ANESTHESIOLOGY

## 2022-05-25 PROCEDURE — 77030008496 HC TBNG ARTHSC IRR S&N -B: Performed by: ORTHOPAEDIC SURGERY

## 2022-05-25 PROCEDURE — 77030002933 HC SUT MCRYL J&J -A: Performed by: ORTHOPAEDIC SURGERY

## 2022-05-25 PROCEDURE — 74011250636 HC RX REV CODE- 250/636

## 2022-05-25 PROCEDURE — 77030013789 HC KT REP BIO TEND ARTH -C: Performed by: ORTHOPAEDIC SURGERY

## 2022-05-25 PROCEDURE — 77030004451 HC BUR SHV S&N -B: Performed by: ORTHOPAEDIC SURGERY

## 2022-05-25 PROCEDURE — 77030021678 HC GLIDESCP STAT DISP VERT -B: Performed by: ANESTHESIOLOGY

## 2022-05-25 PROCEDURE — 74011250637 HC RX REV CODE- 250/637: Performed by: ANESTHESIOLOGY

## 2022-05-25 PROCEDURE — 82962 GLUCOSE BLOOD TEST: CPT

## 2022-05-25 DEVICE — DEVICE GRFT FIX 4.75X19.1 MM ANCHR IMPL BIOCOMP SWIVELOCK: Type: IMPLANTABLE DEVICE | Site: KNEE | Status: FUNCTIONAL

## 2022-05-25 DEVICE — PEEK SWIVELOCK C, 5.5X19.1MM
Type: IMPLANTABLE DEVICE | Site: KNEE | Status: FUNCTIONAL
Brand: ARTHREX®

## 2022-05-25 RX ORDER — OXYCODONE HYDROCHLORIDE 5 MG/1
5 TABLET ORAL
Qty: 28 TABLET | Refills: 0 | Status: SHIPPED | OUTPATIENT
Start: 2022-05-25 | End: 2022-06-01

## 2022-05-25 RX ORDER — GLYCOPYRROLATE 0.2 MG/ML
INJECTION INTRAMUSCULAR; INTRAVENOUS AS NEEDED
Status: DISCONTINUED | OUTPATIENT
Start: 2022-05-25 | End: 2022-05-25 | Stop reason: HOSPADM

## 2022-05-25 RX ORDER — FENTANYL CITRATE 50 UG/ML
INJECTION, SOLUTION INTRAMUSCULAR; INTRAVENOUS
Status: COMPLETED
Start: 2022-05-25 | End: 2022-05-25

## 2022-05-25 RX ORDER — SODIUM CHLORIDE, SODIUM LACTATE, POTASSIUM CHLORIDE, CALCIUM CHLORIDE 600; 310; 30; 20 MG/100ML; MG/100ML; MG/100ML; MG/100ML
25 INJECTION, SOLUTION INTRAVENOUS CONTINUOUS
Status: DISCONTINUED | OUTPATIENT
Start: 2022-05-25 | End: 2022-05-25 | Stop reason: HOSPADM

## 2022-05-25 RX ORDER — ROCURONIUM BROMIDE 10 MG/ML
INJECTION, SOLUTION INTRAVENOUS AS NEEDED
Status: DISCONTINUED | OUTPATIENT
Start: 2022-05-25 | End: 2022-05-25 | Stop reason: HOSPADM

## 2022-05-25 RX ORDER — SUCCINYLCHOLINE CHLORIDE 20 MG/ML
INJECTION INTRAMUSCULAR; INTRAVENOUS AS NEEDED
Status: DISCONTINUED | OUTPATIENT
Start: 2022-05-25 | End: 2022-05-25 | Stop reason: HOSPADM

## 2022-05-25 RX ORDER — OXYCODONE HYDROCHLORIDE 5 MG/1
TABLET ORAL
Status: DISCONTINUED
Start: 2022-05-25 | End: 2022-05-25 | Stop reason: HOSPADM

## 2022-05-25 RX ORDER — FENTANYL CITRATE 50 UG/ML
25 INJECTION, SOLUTION INTRAMUSCULAR; INTRAVENOUS
Status: COMPLETED | OUTPATIENT
Start: 2022-05-25 | End: 2022-05-25

## 2022-05-25 RX ORDER — DEXAMETHASONE SODIUM PHOSPHATE 4 MG/ML
INJECTION, SOLUTION INTRA-ARTICULAR; INTRALESIONAL; INTRAMUSCULAR; INTRAVENOUS; SOFT TISSUE AS NEEDED
Status: DISCONTINUED | OUTPATIENT
Start: 2022-05-25 | End: 2022-05-25 | Stop reason: HOSPADM

## 2022-05-25 RX ORDER — ACETAMINOPHEN 500 MG
TABLET ORAL
Status: DISCONTINUED
Start: 2022-05-25 | End: 2022-05-25 | Stop reason: HOSPADM

## 2022-05-25 RX ORDER — LIDOCAINE HYDROCHLORIDE 20 MG/ML
INJECTION, SOLUTION EPIDURAL; INFILTRATION; INTRACAUDAL; PERINEURAL AS NEEDED
Status: DISCONTINUED | OUTPATIENT
Start: 2022-05-25 | End: 2022-05-25 | Stop reason: HOSPADM

## 2022-05-25 RX ORDER — SODIUM CHLORIDE 0.9 % (FLUSH) 0.9 %
5-40 SYRINGE (ML) INJECTION EVERY 8 HOURS
Status: DISCONTINUED | OUTPATIENT
Start: 2022-05-25 | End: 2022-05-25 | Stop reason: HOSPADM

## 2022-05-25 RX ORDER — KETOROLAC TROMETHAMINE 30 MG/ML
INJECTION, SOLUTION INTRAMUSCULAR; INTRAVENOUS
Status: DISCONTINUED
Start: 2022-05-25 | End: 2022-05-25 | Stop reason: HOSPADM

## 2022-05-25 RX ORDER — ROPIVACAINE HYDROCHLORIDE 5 MG/ML
INJECTION, SOLUTION EPIDURAL; INFILTRATION; PERINEURAL
Status: COMPLETED
Start: 2022-05-25 | End: 2022-05-25

## 2022-05-25 RX ORDER — SODIUM CHLORIDE 0.9 % (FLUSH) 0.9 %
5-40 SYRINGE (ML) INJECTION AS NEEDED
Status: DISCONTINUED | OUTPATIENT
Start: 2022-05-25 | End: 2022-05-25 | Stop reason: HOSPADM

## 2022-05-25 RX ORDER — LIDOCAINE HYDROCHLORIDE 10 MG/ML
0.1 INJECTION, SOLUTION EPIDURAL; INFILTRATION; INTRACAUDAL; PERINEURAL AS NEEDED
Status: DISCONTINUED | OUTPATIENT
Start: 2022-05-25 | End: 2022-05-25 | Stop reason: HOSPADM

## 2022-05-25 RX ORDER — MIDAZOLAM HYDROCHLORIDE 1 MG/ML
INJECTION, SOLUTION INTRAMUSCULAR; INTRAVENOUS AS NEEDED
Status: DISCONTINUED | OUTPATIENT
Start: 2022-05-25 | End: 2022-05-25 | Stop reason: HOSPADM

## 2022-05-25 RX ORDER — HYDROMORPHONE HYDROCHLORIDE 1 MG/ML
.2-.5 INJECTION, SOLUTION INTRAMUSCULAR; INTRAVENOUS; SUBCUTANEOUS ONCE
Status: DISCONTINUED | OUTPATIENT
Start: 2022-05-25 | End: 2022-05-25 | Stop reason: HOSPADM

## 2022-05-25 RX ORDER — ONDANSETRON 4 MG/1
4 TABLET, FILM COATED ORAL
Qty: 30 TABLET | Refills: 0 | Status: SHIPPED | OUTPATIENT
Start: 2022-05-25

## 2022-05-25 RX ORDER — ONDANSETRON 2 MG/ML
INJECTION INTRAMUSCULAR; INTRAVENOUS AS NEEDED
Status: DISCONTINUED | OUTPATIENT
Start: 2022-05-25 | End: 2022-05-25 | Stop reason: HOSPADM

## 2022-05-25 RX ORDER — KETOROLAC TROMETHAMINE 30 MG/ML
30 INJECTION, SOLUTION INTRAMUSCULAR; INTRAVENOUS
Status: COMPLETED | OUTPATIENT
Start: 2022-05-25 | End: 2022-05-25

## 2022-05-25 RX ORDER — ACETAMINOPHEN 500 MG
1000 TABLET ORAL ONCE
Status: COMPLETED | OUTPATIENT
Start: 2022-05-25 | End: 2022-05-25

## 2022-05-25 RX ORDER — MIDAZOLAM HYDROCHLORIDE 1 MG/ML
INJECTION, SOLUTION INTRAMUSCULAR; INTRAVENOUS
Status: COMPLETED
Start: 2022-05-25 | End: 2022-05-25

## 2022-05-25 RX ORDER — OXYCODONE AND ACETAMINOPHEN 5; 325 MG/1; MG/1
1 TABLET ORAL
Status: DISCONTINUED | OUTPATIENT
Start: 2022-05-25 | End: 2022-05-25 | Stop reason: HOSPADM

## 2022-05-25 RX ORDER — HYDROMORPHONE HYDROCHLORIDE 2 MG/ML
INJECTION, SOLUTION INTRAMUSCULAR; INTRAVENOUS; SUBCUTANEOUS AS NEEDED
Status: DISCONTINUED | OUTPATIENT
Start: 2022-05-25 | End: 2022-05-25 | Stop reason: HOSPADM

## 2022-05-25 RX ORDER — OXYCODONE HYDROCHLORIDE 5 MG/1
5 TABLET ORAL ONCE
Status: COMPLETED | OUTPATIENT
Start: 2022-05-25 | End: 2022-05-25

## 2022-05-25 RX ORDER — NEOSTIGMINE METHYLSULFATE 1 MG/ML
INJECTION, SOLUTION INTRAVENOUS AS NEEDED
Status: DISCONTINUED | OUTPATIENT
Start: 2022-05-25 | End: 2022-05-25 | Stop reason: HOSPADM

## 2022-05-25 RX ORDER — ROPIVACAINE HYDROCHLORIDE 5 MG/ML
INJECTION, SOLUTION EPIDURAL; INFILTRATION; PERINEURAL
Status: COMPLETED | OUTPATIENT
Start: 2022-05-25 | End: 2022-05-25

## 2022-05-25 RX ORDER — DROPERIDOL 2.5 MG/ML
0.62 INJECTION, SOLUTION INTRAMUSCULAR; INTRAVENOUS AS NEEDED
Status: DISCONTINUED | OUTPATIENT
Start: 2022-05-25 | End: 2022-05-25 | Stop reason: HOSPADM

## 2022-05-25 RX ORDER — MORPHINE SULFATE 10 MG/ML
2 INJECTION, SOLUTION INTRAMUSCULAR; INTRAVENOUS
Status: DISCONTINUED | OUTPATIENT
Start: 2022-05-25 | End: 2022-05-25 | Stop reason: HOSPADM

## 2022-05-25 RX ORDER — DEXMEDETOMIDINE HYDROCHLORIDE 100 UG/ML
INJECTION, SOLUTION INTRAVENOUS AS NEEDED
Status: DISCONTINUED | OUTPATIENT
Start: 2022-05-25 | End: 2022-05-25 | Stop reason: HOSPADM

## 2022-05-25 RX ORDER — PROPOFOL 10 MG/ML
INJECTION, EMULSION INTRAVENOUS AS NEEDED
Status: DISCONTINUED | OUTPATIENT
Start: 2022-05-25 | End: 2022-05-25 | Stop reason: HOSPADM

## 2022-05-25 RX ORDER — DIPHENHYDRAMINE HYDROCHLORIDE 50 MG/ML
12.5 INJECTION, SOLUTION INTRAMUSCULAR; INTRAVENOUS AS NEEDED
Status: DISCONTINUED | OUTPATIENT
Start: 2022-05-25 | End: 2022-05-25 | Stop reason: HOSPADM

## 2022-05-25 RX ORDER — FENTANYL CITRATE 50 UG/ML
INJECTION, SOLUTION INTRAMUSCULAR; INTRAVENOUS AS NEEDED
Status: DISCONTINUED | OUTPATIENT
Start: 2022-05-25 | End: 2022-05-25 | Stop reason: HOSPADM

## 2022-05-25 RX ADMIN — ROCURONIUM BROMIDE 10 MG: 10 INJECTION INTRAVENOUS at 15:18

## 2022-05-25 RX ADMIN — FENTANYL CITRATE 50 MCG: 50 INJECTION, SOLUTION INTRAMUSCULAR; INTRAVENOUS at 15:32

## 2022-05-25 RX ADMIN — FENTANYL CITRATE 50 MCG: 50 INJECTION, SOLUTION INTRAMUSCULAR; INTRAVENOUS at 15:18

## 2022-05-25 RX ADMIN — ROCURONIUM BROMIDE 20 MG: 10 INJECTION INTRAVENOUS at 15:32

## 2022-05-25 RX ADMIN — ROPIVACAINE HYDROCHLORIDE 30 ML: 5 INJECTION, SOLUTION EPIDURAL; INFILTRATION; PERINEURAL at 14:50

## 2022-05-25 RX ADMIN — Medication 1000 MG: at 17:21

## 2022-05-25 RX ADMIN — DEXAMETHASONE SODIUM PHOSPHATE 8 MG: 4 INJECTION, SOLUTION INTRAMUSCULAR; INTRAVENOUS at 15:28

## 2022-05-25 RX ADMIN — GLYCOPYRROLATE 0.4 MG: 0.2 INJECTION, SOLUTION INTRAMUSCULAR; INTRAVENOUS at 16:49

## 2022-05-25 RX ADMIN — OXYCODONE HYDROCHLORIDE 5 MG: 5 TABLET ORAL at 17:39

## 2022-05-25 RX ADMIN — ONDANSETRON HYDROCHLORIDE 4 MG: 2 INJECTION, SOLUTION INTRAMUSCULAR; INTRAVENOUS at 16:51

## 2022-05-25 RX ADMIN — FENTANYL CITRATE 25 MCG: 50 INJECTION, SOLUTION INTRAMUSCULAR; INTRAVENOUS at 17:29

## 2022-05-25 RX ADMIN — KETOROLAC TROMETHAMINE 30 MG: 30 INJECTION, SOLUTION INTRAMUSCULAR at 17:22

## 2022-05-25 RX ADMIN — SODIUM CHLORIDE, POTASSIUM CHLORIDE, SODIUM LACTATE AND CALCIUM CHLORIDE: 600; 310; 30; 20 INJECTION, SOLUTION INTRAVENOUS at 16:43

## 2022-05-25 RX ADMIN — HYDROMORPHONE HYDROCHLORIDE 0.5 MG: 2 INJECTION, SOLUTION INTRAMUSCULAR; INTRAVENOUS; SUBCUTANEOUS at 16:39

## 2022-05-25 RX ADMIN — WATER 2 G: 1 INJECTION INTRAMUSCULAR; INTRAVENOUS; SUBCUTANEOUS at 15:24

## 2022-05-25 RX ADMIN — HYDROMORPHONE HYDROCHLORIDE 0.5 MG: 2 INJECTION, SOLUTION INTRAMUSCULAR; INTRAVENOUS; SUBCUTANEOUS at 15:43

## 2022-05-25 RX ADMIN — FENTANYL CITRATE 50 MCG: 50 INJECTION, SOLUTION INTRAMUSCULAR; INTRAVENOUS at 14:46

## 2022-05-25 RX ADMIN — FENTANYL CITRATE 25 MCG: 50 INJECTION, SOLUTION INTRAMUSCULAR; INTRAVENOUS at 17:37

## 2022-05-25 RX ADMIN — HYDROMORPHONE HYDROCHLORIDE 1 MG: 2 INJECTION, SOLUTION INTRAMUSCULAR; INTRAVENOUS; SUBCUTANEOUS at 16:52

## 2022-05-25 RX ADMIN — FENTANYL CITRATE 25 MCG: 50 INJECTION, SOLUTION INTRAMUSCULAR; INTRAVENOUS at 17:24

## 2022-05-25 RX ADMIN — SODIUM CHLORIDE, POTASSIUM CHLORIDE, SODIUM LACTATE AND CALCIUM CHLORIDE 25 ML/HR: 600; 310; 30; 20 INJECTION, SOLUTION INTRAVENOUS at 14:26

## 2022-05-25 RX ADMIN — SUCCINYLCHOLINE CHLORIDE 160 MG: 20 INJECTION, SOLUTION INTRAMUSCULAR; INTRAVENOUS at 15:18

## 2022-05-25 RX ADMIN — DEXMEDETOMIDINE HYDROCHLORIDE 10 MCG: 100 INJECTION, SOLUTION, CONCENTRATE INTRAVENOUS at 16:48

## 2022-05-25 RX ADMIN — DEXMEDETOMIDINE HYDROCHLORIDE 10 MCG: 100 INJECTION, SOLUTION, CONCENTRATE INTRAVENOUS at 16:51

## 2022-05-25 RX ADMIN — LIDOCAINE HYDROCHLORIDE 80 MG: 20 INJECTION, SOLUTION EPIDURAL; INFILTRATION; INTRACAUDAL; PERINEURAL at 15:18

## 2022-05-25 RX ADMIN — MIDAZOLAM HYDROCHLORIDE 4 MG: 1 INJECTION, SOLUTION INTRAMUSCULAR; INTRAVENOUS at 14:46

## 2022-05-25 RX ADMIN — PROPOFOL 200 MG: 10 INJECTION, EMULSION INTRAVENOUS at 15:18

## 2022-05-25 RX ADMIN — FENTANYL CITRATE 25 MCG: 50 INJECTION, SOLUTION INTRAMUSCULAR; INTRAVENOUS at 17:32

## 2022-05-25 RX ADMIN — Medication 3 MG: at 16:49

## 2022-05-25 NOTE — DISCHARGE INSTRUCTIONS
Devang Solares Leader  ACL Surgery: Postoperative Instructions      >>>You received Tylenol 1000mg at 5:30pm. Suggest he take his Arthritis Tylenol every 8 hours as bottle directs. Take this on schedule.    >>>You received Toradol during your surgery. You may not take any form of NSAIDS (non steroidal anti inflammatory drugs) such as Advil, Ibuprofen, Aleve, Motrin until 11:30pm.    >> You received Oxycodone 5mg at 5:45pm so do not repeat until 11:45pm.     IMPORTANT  Successful rehab after ACL surgery has been directly associated with the ability to fully straighten out your knee at the end of rehab. Following these instructions will help ensure that we do everything possible to achieve this goal. Elevation of the leg along with cold therapy is critical in the first weeks after surgery in an effort to reduce swelling. This ensures that the knee will move easily to regain motion once therapy is started the second week. · Your leg has been placed in a hinged brace after surgery for comfort and to protect your new graft. · The brace has been locked at 10 degrees to keep your leg as straight as possible after surgery   · This allows you to walk on the leg without the leg giving way and to prevent you from losing the ability to fully straighten your leg. · This brace should be worn AT ALL TIMES when you are out of bed after surgery   · This brace may be taken off when you are in bed and laying down so that you may apply the cryotherapy unit (polar care ice) over the dressing. · The optimal position of the leg after surgery is for you to be lying flat with your ankle higher than your heart , in an effort to reduce swelling.   · Place pillows under your ANKLE to allow your knee to sag backwards to keep it straight   · You may practice bending the knee with no weight on it, but should return it to straight position when you are resting & elevating  · DO NOT sit with a pillow under your knee, as this will lead you to being unable to fully straighten your leg after surgery. · If for some reason your brace becomes unlocked or loose, please contact my office for instruction on how to fix this issue. · You will come out of your brace with your physical therapist for therapy, but will not be released from your brace until you can demonstrate the ability to walk without crutches or a limp. DIET  · You may resume your regular diet once you tolerate liquids/bland food without nausea    MEDICATION  · Take the pain medication as prescribed, WITH FOOD Narcotic is Oxycodone every 6 hours,     · While taking pain medications, you may NOT operate a vehicle, heavy machinery, or appliances, or drink alcoholic beverages  · If you have an allergic reaction to the medication, stop taking it and call my office. · If you are not allergic, take one Aspirin 81 mg twice a day after eating to prevent blood clots  · Please keep in mind that constipation is a very common side effect of taking narcotic pain medication. Take precautions to prevent constipation:  · Drink plenty of water (6-8 -  8 ounce glasses a day)  · Avoid alcohol, caffeine & dairy products  · Eat plenty of fiber (fruits, vegetables & whole grains)  · Take an over the counter stool softener such as Colace or Dulcolax or Miralax    ACTIVITY  · You may practice quadriceps muscle tightening and straight leg raises several times every hour  · Please continue to move your ankle up and down and tighten and relax your calf muscles several times every hour to help reduce swelling and prevent blood clots  · Please use your crutches until your first post operative visit  · If comfortable, you may bear weight on your leg with the assistance of crutches  · It is important to continuously elevate your knee above your heart until your swelling is completely down. · Please keep ice applied to the knee for the first 72 hours or as long as pain or swelling is persists.  Do not apply ice directly to the skin, or allow water to leak on to your dressing. DRESSING CARE  · Keep the dressing clean and dry. · It is normal to get some bloody drainage through the bandage. DO NOT BE ALARMED. · If the dressing gets soaked with drainage, please reinforce with a dry sterile dressing. · Loosen the ace wrap around your knee if it becomes too tight or painful. · Remove all dressings 3 days after surgery. If there is still some wound seepage, apply a fresh STERILE gauze over the incisions and secure with tape or an ace wrap. · DO NOT TOUCH OR REMOVE THE SUTURES! SHOWERING  · You may sponge bathe for the first 72 hours, taking care to keep the dressing clean and dry. · You may remove the dressing in 3 days and shower after surgery unless told otherwise. DO NOT immerse the knee under water. DO NOT rub/scrub the incision. DO NOT apply any ointments. After showering pat the incisions dry & place new Band-Aids over the sutures. EMERGENCY/FOLLOW - UP  · Please notify my office at [phone] if you develop any fever (above 101), unexpected warmth, redness or swelling. Please call if your toes become cold, purple, numb, or there is excessive bleeding. · Please call the office within 24 hours at [phone] to schedule a follow up appointment within 7-10 days from surgery. · Narcotic pain medication refills cannot be called into your pharmacy and the prescription must be picked up at our office. No pain medication refills can be provided after 3pm on Fridays or over the weekend. TAKE NARCOTIC PAIN MEDICATIONS WITH FOOD! Narcotics tend to be constipating and we recommend taking a stool softener such as Colace or Miralax (follow package instructions). If you were given prescriptions, please review the written information on the prescribed medications. DO NOT DRIVE WHILE TAKING NARCOTIC PAIN MEDICATIONS.     CPAP PATIENTS BE SURE TO WEAR MACHINE WHENEVER NAPPING OR SLEEPING DAY/NIGHT OF SURGERY! DISCHARGE SUMMARY from Nurse    The following personal items collected during your admission are returned to you:   Dental Appliance: Dental Appliances: None  Vision: Visual Aid: Glasses  Hearing Aid:    Jewelry: Jewelry: None  Clothing: Clothing: With patient  Other Valuables: Other Valuables: Cell Phone  Valuables sent to safe:        PATIENT INSTRUCTIONS:    After General Anesthesia or Intravenous Sedation, for 24 hours or while taking prescription Narcotics:  · Someone should be with you for the next 24 hours. · For your own safety, a responsible adult must drive you home. · Limit your activities  · Recommended activity: Rest today, up with assistance today. Do not climb stairs or shower unattended for the next 24 hours. · Start with a soft bland diet and advance as tolerated (no nausea) to regular diet. · If you have a sore throat some things that may help are: fluids, warm salt water gargle, or throat lozenges. If this does not improve after several days please follow up with your family physician. · Do not drive and operate hazardous machinery  · Do not make important personal or business decisions  · Do  not drink alcoholic beverages  · If you have not urinated within 8 hours after discharge, please contact your surgeon on call. Report the following to your surgeon:  · Excessive pain, swelling, redness or odor of or around the surgical area  · Temperature over 100.5  · Nausea and vomiting lasting longer than 4 hours or if unable to take medications  · Any signs of decreased circulation or nerve impairment to extremity: change in color, persistent  numbness, tingling, coldness or increase pain    · If you received an upper extremity nerve block, please wear your sling until the block has worn off, then refer to your surgeons post-operative instructions.           I    · If you received a lower extremity nerve block, please use your crutches, walker, or cane until the nerve block has worn off, then refer to your surgeons post-operative instructions.    · You will receive a Post Operative Call from one of the Recovery Room Nurses on the day after your surgery to check on you. It is very important for us to know how you are recovering after your surgery. If you have an issue please call your surgeon, do not wait for the post operative call. · You may receive an e-mail or letter in the mail from [employer] regarding your experience with us in the Ambulatory Surgery Unit. Your feedback is valuable to us and we appreciate your participation in the survey. ·   · If the above instructions are not adequate or you are having problems after your surgery, call your physician at their office number. ·   · We wish youre a speedy recovery ? What to do at Home:    *  Please give a list of your current medications to your Primary Care Provider. *  Please update this list whenever your medications are discontinued, doses are      changed, or new medications (including over-the-counter products) are added. *  Please carry medication information at all times in case of emergency situations. If you have not had your influenza or pneumococcal vaccines, please follow up with your primary care physician. The discharge information has been reviewed with the patient and caregiver. The patient and caregiver verbalized understanding. [employer] THROMBOSIS AND PULMONARY EMBOLUS    SURGICAL PATIENTS  Surgical patients are the #1 risk for DVT and PE. WHAT IS DVT? WHAT IS PE?  DVT is a serious condition where blood clots develop deep in the veins of the legs. PE occurs when a blood clot breaks loose from the wall of a vein and travels to the lungs blocking the pulmonary artery or one of its branches impairing blood flow from the heart, which could result in death.   RISK FACTORS   Surgery lasting longer than 45 minutes   History of inflammatory bowel disease   Oral contraceptive or hormone replacement therapy   Immobilization   Varicose veins / swollen legs   Smoking    CHF / Acute MI / Irregular heart beat   Family history of thrombosis   General anesthesia greater than 2 hours   Obesity   Infection of less than one month   Less than 1 month postpartum   COPD / Pneumonia   Arthroscopic surgery   Malignancy / cancer   Spine surgery   Blood abnormalities   Stroke / Paralysis / Coma    SIGNS AND SYMPTOMS OF DEEP VEIN TROMBOSIS   -  ER VISIT  Usually occurs in one leg, above or below the knee   Swelling - one calf or thigh may be larger than the other   Feeling increased warmth in the area of the leg that is swollen or painful   Leg pain, which may increase when standing or walking   Swelling along the vein of the leg   When swollen areas is pressed with a finger, a depression may remain   Tenderness of the leg that may be confined to one area   Change in leg color (bluish or red)    SIGNS AND SYMPTOMS OF PULMONARY EMBOLUS  - 911 CALL   Chest pain that gets worse with deep breath, coughing or chest movement   Coughing up blood   Sweating   Shortness of breath or difficulty breathing   Rapid heart beat   Lightheadedness    HOW TO REDUCE THE POSSIBLE RISK OF DVT   Exercise - simple activities as rotating ankles and wrists, wiggling toes and fingers, tightening and relaxing muscles in calves and thighs promotes circulation while recovering from surgery. Please do these exercises every hour during waking hours   BOBBY hose - If you have been given white support hose while having surgery, wear them home. You may remove them for half an hour every 8-hour period and stop wearing them 48 hours after surgery or as prescribed by your physician.  BOBBY hose may be reused for air travel or extended car travel   Take mediation as prescribed by your physician (Lovenox, Coumadin, Aspirin)   Resume your normal activities as soon as your doctor advises you to do so.  Remember, when traveling, to [place] your legs frequently. Wear non skid shoes when BOBBY hose are on. They are very slippery! PATIENTS WHO BELIEVE THEY MAY BE EXPERIENCING SIGNS AND SYMPTOMS OF DVT OR PE SHOULD SEEK MEDICAL HELP IMMEDIATELY      TO PREVENT AN INFECTION      1. [address] YOUR HANDS     To prevent infection, good handwashing is the most important thing you or your caregiver can do.  Wash your hands with soap and water or use the hand  we gave you before you touch any wounds. 2. SHOWER     Use the antibacterial soap we gave you when you take a shower.  Shower with this soap until your wounds are healed.  To reach all areas of your body, you may need someone to help you.  Dont forget to clean your belly button with every shower. 3.  USE CLEAN SHEETS     Use freshly cleaned sheets on your bed after surgery.  To keep the surgery site clean, do not allow pets to sleep with you while your wound is still healing. 4. STOP SMOKING     Stop smoking, or at least cut back on smoking     Smoking slows your healing. 5.  CONTROL YOUR BLOOD SUGAR     High blood sugars slow wound healing.  If you are diabetic, control your blood sugar levels before and after your surgery.

## 2022-05-25 NOTE — PERIOP NOTES
Reiviewed instructions with caregivers, Shantel Hussein and Oleksandr Reyes , and family of brother and MOM. Brother understands brace care and how to get in house safely with buttocks on steps and he hold leg out just as we demo'd to get in car.

## 2022-05-25 NOTE — ANESTHESIA PROCEDURE NOTES
Peripheral Block    Start time: 5/25/2022 2:43 PM  End time: 5/25/2022 2:53 PM  Performed by: Leigh Haney MD  Authorized by: Leigh Haney MD       Pre-procedure:    Indications: at surgeon's request and post-op pain management    Preanesthetic Checklist: patient identified, risks and benefits discussed, site marked, timeout performed, anesthesia consent given and patient being monitored    Timeout Time: 14:45 EDT          Block Type:   Block Type:  Femoral single shot  Laterality:  Right  Monitoring:  Standard ASA monitoring, responsive to questions and oxygen  Injection Technique:  Single shot  Procedures: ultrasound guided    Patient Position: supine  Prep: chlorhexidine    Location:  Upper thigh  Needle Type:  Stimuplex  Needle Gauge:  22 G  Needle Localization:  Ultrasound guidance and nerve stimulator  Motor Response comment:   Motor Response: minimal motor response >0.4 mA   Medication Injected:  Ropivacaine (PF) (NAROPIN)(0.5%) 5 mg/mL injection, 30 mL  Med Admin Time: 5/25/2022 2:50 PM    Assessment:  Number of attempts:  1  Injection Assessment:  Incremental injection every 5 mL, no paresthesia, ultrasound image on chart, local visualized surrounding nerve on ultrasound, negative aspiration for blood and no intravascular symptoms  Patient tolerance:  Patient tolerated the procedure well with no immediate complications

## 2022-05-25 NOTE — PERIOP NOTES
Air Warming blanket placed on pt; turned on for comfort      Permission received to review discharge instructions and discuss private health information with mother and brother and will have someone with them after discharge     Dr. Ruben Foreman performed a RL Extremity   nerve block with the U/S machine. PT  was on cardiac monitoring, pulse oximetry and 3L NC O2.  Pt. Was given  4 mg of versed and 50 mcg of fentanyl  IV for sedation. VSS. Pt.  Slightly drowsy easy to arouse  post block. T[address]. [address] [place]

## 2022-05-25 NOTE — ANESTHESIA PREPROCEDURE EVALUATION
Relevant Problems   No relevant active problems       Anesthetic History     PONV          Review of Systems / Medical History  Patient summary reviewed, nursing notes reviewed and pertinent labs reviewed    Pulmonary        Sleep apnea: CPAP           Neuro/Psych   Within defined limits           Cardiovascular  Within defined limits                Exercise tolerance: >4 METS     GI/Hepatic/Renal     GERD: well controlled      Liver disease (fatty liver)     Endo/Other    Diabetes: type 2    Arthritis     Other Findings   Comments: Right knee ACL & MCL tears         Physical Exam    Airway  Mallampati: II  TM Distance: 4 - 6 cm  Neck ROM: normal range of motion   Mouth opening: Diminished (comment)     Cardiovascular    Rhythm: regular  Rate: normal         Dental  No notable dental hx       Pulmonary  Breath sounds clear to auscultation               Abdominal         Other Findings            Anesthetic Plan    ASA: 2  Anesthesia type: general and regional - femoral single shot      Post-op pain plan if not by surgeon: peripheral nerve block single    Induction: Intravenous  Anesthetic plan and risks discussed with: Patient      preop glucose 159

## 2022-05-25 NOTE — PERIOP NOTES
Pt presenting into the recovery room sleeping. 1716-Pt waking up, complaining of pain, polar care applied. Blood sugar by fingerstick is 179. Pt medicated with toradol 30mg iv and tylenol 1000 mg and fentanyl 100 mcg. 1730-Pt eating crackers and tolerating liquids. Pt medicated with oxycodone 5mg     1806-D/c instructions reviewed with family and caregivers, 2 copies given, all questions answered. Reviewed when to call the doctor, diet, activity and hygiene. Reviewed about use of block, when to take pain meds and what pain meds to take, use of ice-polar care machine and T scope knee immobilizer. Reviewed about only pillow under ankle. Reviewed that pt can take NSAIDS after calling Dr. Camille Oliver, advised he  Just discontinued one since pt had 3 listed. Informed pt, family and caregivers to use ibuprofen 600mg every 6 hours instead of mobic and voltaren. Also with call to Dr. Camille Oliver discussed pt may take 2 oxycodone first 2 days for pain 10 out of 10 and he is wide awake. 1830-All instructions and demonstrations completed. IV removed and pt dressed. 1845-Pt transported to awaiting transportation with wheelchair with R leg elevated.

## 2022-05-25 NOTE — BRIEF OP NOTE
Brief Postoperative Note    Patient: Whit Waller  YOB: 1970  MRN: [id number]    Date of Procedure: 5/25/2022     Pre-Op Diagnosis: RIGHT ACL TEAR,   MCL TEAR    Post-Op Diagnosis: Same as preoperative diagnosis. Procedure(s):  RIGHT KNEE ARTHROSCOPY  MEDIAL COLLATERAL LIGAMENT REPAIR, AND ANTERIOR CRUCIATE LIGAMENT  RECONSTRUCTION (BLOCK)    Surgeon(s):  Melody Rueda DO    Surgical Assistant: Surg Asst-1: Nathaly Barraza    Anesthesia: General     Estimated Blood Loss (mL): Minimal    Complications: None    Specimens: * No specimens in log *     Implants:   Implant Name Type Inv.  Item Serial No.  Lot No. LRB No. Used Action   AR[employer]RE[employer] SUTURE BUTTON   NA  [id number] Right 1 Implanted   ARTHREX SUTURE BUTTON   NA  [id number] Right 1 Implanted   [employer] [place]  N/A ARTHREX [id number] Right 1 Implanted   ANCHOR SUT CLOS EYE 5.5X19.1MM -- PEEK SWIVELOCK - SN/A  ANCHOR SUT CLOS EYE 5.5X19.1MM -- PEEK [facility]_ [id number] Right 1 Implanted       Drains: * No LDAs found *    Findings: mcl/acl tears    Electronically Signed by Linda Ty DO on 5/25/2022 at 4:46 PM

## 2022-05-25 NOTE — ANESTHESIA POSTPROCEDURE EVALUATION
Procedure(s):  RIGHT KNEE ARTHROSCOPY  MEDIAL COLLATERAL LIGAMENT REPAIR, AND ANTERIOR CRUCIATE LIGAMENT  RECONSTRUCTION (BLOCK). general, regional    Anesthesia Post Evaluation      Multimodal analgesia: multimodal analgesia used between 6 hours prior to anesthesia start to PACU discharge  Patient location during evaluation: PACU  Patient participation: complete - patient participated  Level of consciousness: awake and alert  Pain management: satisfactory to patient  Airway patency: patent  Anesthetic complications: no  Cardiovascular status: acceptable  Respiratory status: acceptable  Hydration status: acceptable  Comments: Pt is mostly complaining of aching in his lower leg. Femoral block is controlling pain where the incisions are. Has received multimodal analgesia. Non-weight bearing postop. Post anesthesia nausea and vomiting:  none  Final Post Anesthesia Temperature Assessment:  Normothermia (36.0-37.5 degrees C)      INITIAL Post-op Vital signs:   Vitals Value Taken Time   /96 05/25/22 1759   Temp 36.4 °C (97.6 °F) 05/25/22 1729   Pulse 72 05/25/22 1805   Resp 14 05/25/22 1805   SpO2 96 % 05/25/22 1805   Vitals shown include unvalidated device data.
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
## 2022-05-27 DIAGNOSIS — G89.18 POST-OP PAIN: Primary | ICD-10-CM

## 2022-05-27 RX ORDER — NALOXONE HYDROCHLORIDE 4 MG/.1ML
SPRAY NASAL
Qty: 1 EACH | Refills: 0 | Status: SHIPPED | OUTPATIENT
Start: 2022-05-27

## 2022-05-27 RX ORDER — IBUPROFEN 800 MG/1
800 TABLET ORAL
Qty: 60 TABLET | Refills: 0 | Status: SHIPPED | OUTPATIENT
Start: 2022-05-27

## 2022-05-27 RX ORDER — HYDROMORPHONE HYDROCHLORIDE 4 MG/1
4 TABLET ORAL
Qty: 28 TABLET | Refills: 0 | Status: SHIPPED | OUTPATIENT
Start: 2022-05-27 | End: 2022-06-01

## 2022-05-31 DIAGNOSIS — G89.18 POST-OP PAIN: Primary | ICD-10-CM

## 2022-05-31 RX ORDER — HYDROMORPHONE HYDROCHLORIDE 4 MG/1
4 TABLET ORAL
Qty: 28 TABLET | Refills: 0 | Status: SHIPPED | OUTPATIENT
Start: 2022-05-31 | End: 2022-06-05

## 2022-06-03 ENCOUNTER — OFFICE VISIT (OUTPATIENT)
Dept: ORTHOPEDIC SURGERY | Age: 52
End: 2022-06-03
Payer: COMMERCIAL

## 2022-06-03 VITALS — HEIGHT: 71 IN | BODY MASS INDEX: 29.4 KG/M2 | WEIGHT: 210 LBS

## 2022-06-03 DIAGNOSIS — Z98.890 STATUS POST ARTHROSCOPY OF KNEE: Primary | ICD-10-CM

## 2022-06-03 PROCEDURE — 99024 POSTOP FOLLOW-UP VISIT: CPT | Performed by: ORTHOPAEDIC SURGERY

## 2022-06-03 NOTE — PROGRESS NOTES
Ale Ware (: 1970) is a 45 y.o. male, established patient, here for evaluation of the following chief complaint(s):  Post OP Follow Up and Knee Pain       ASSESSMENT/PLAN:  Below is the assessment and plan developed based on review of pertinent history, physical exam, labs, studies, and medications. Overall he seems to be doing well. We discussed continuing to ice and elevate. He would like to try home health physical therapy as he cannot currently drive. He may begin driving once he is off pain medication once he feels comfortable with slight knee bending and strength to push on the gas and brake. I will see him back in 1 month    1. Status post arthroscopy of knee  -     REFERRAL TO PHYSICAL THERAPY      Return in about 4 weeks (around 2022). SUBJECTIVE/OBJECTIVE:  Ale Ware (: 1970) is a 45 y.o. male. He is 1 week status post right knee arthroscopy with ACL repair and MCL repair. Overall he notes that it has been painful but he is currently getting better. He notes swelling. Allergies   Allergen Reactions    Sulfa (Sulfonamide Antibiotics) Hives       Current Outpatient Medications   Medication Sig    HYDROmorphone (Dilaudid) 4 mg tablet Take 1 Tablet by mouth every four (4) hours as needed for Pain for up to 5 days. Max Daily Amount: 24 mg.    ibuprofen (MOTRIN) 800 mg tablet Take 1 Tablet by mouth every eight (8) hours as needed for Pain.  naloxone (NARCAN) 4 mg/actuation nasal spray Use 1 spray intranasally, then discard. Repeat with new spray every 2 min as needed for opioid overdose symptoms, alternating nostrils.  ondansetron hcl (Zofran) 4 mg tablet Take 1 Tablet by mouth every eight (8) hours as needed for Nausea or Vomiting.  acetaminophen (Tylenol Extra Strength) 500 mg tablet Take 1,000 mg by mouth every six (6) hours as needed for Pain.     FreeStyle Larissa 2 Sensor kit APPLY 1 APPLICATION EVERY 2 WEEKS    testosterone 12.5 mg/ 1.25 gram (1 %) glpm APPLY 4 PUMPS TOPICALLY TO SKIN EVERY DAY    coenzyme Q-10 (CO Q-10) 30 mg capsule Take 30 mg by mouth.  ezetimibe (Zetia) 10 mg tablet Take 10 mg by mouth daily.  aspirin delayed-release 81 mg tablet Take 81 mg by mouth daily. Indications: FOR GOOD HEALTH    methocarbamoL (ROBAXIN) 500 mg tablet Take 500 mg by mouth four (4) times daily. (Patient not taking: Reported on 5/25/2022)    amitriptyline (ELAVIL) 25 mg tablet Take 25 mg by mouth nightly.  atorvastatin (LIPITOR) 20 mg tablet Take 20 mg by mouth daily.  canagliflozin-metformin 50-1,000 mg tab Take 1 Tablet by mouth two (2) times a day.  finasteride (PROSCAR) 5 mg tablet Take 5 mg by mouth two (2) times a day.  glimepiride (AMARYL PO) Take  by mouth two (2) times a day.  Lantus Solostar U-100 Insulin 100 unit/mL (3 mL) inpn ADMINISTER 30 UNITS UNDER THE SKIN EVERY DAY    diclofenac EC (VOLTAREN) 75 mg EC tablet Take 1 Tablet by mouth two (2) times a day.  ibuprofen (MOTRIN) 600 mg tablet Take 1 Tab by mouth every six (6) hours as needed for Pain. No current facility-administered medications for this visit. Social History     Socioeconomic History    Marital status: SINGLE     Spouse name: Not on file    Number of children: Not on file    Years of education: Not on file    Highest education level: Not on file   Occupational History    Not on file   Tobacco Use    Smoking status: Former Smoker    Smokeless tobacco: Never Used   Vaping Use    Vaping Use: Never used   Substance and Sexual Activity    Alcohol use:  Yes     Alcohol/week: 7.0 standard drinks     Types: 7 Shots of liquor per week    Drug use: Never    Sexual activity: Not on file   Other Topics Concern    Not on file   Social History Narrative    Not on file     Social Determinants of Health     Financial Resource Strain:     Difficulty of Paying Living Expenses: Not on file   Food Insecurity:     Worried About Running Out of [employer] in the Last Year: Not on file    Ran Out of Food in the Last Year: Not on file   Transportation Needs:     Lack of Transportation (Medical): Not on file    Lack of Transportation (Non-Medical): Not on file   Physical Activity:     Days of Exercise per Week: Not on file    Minutes of Exercise per Session: Not on file   Stress:     Feeling of Stress : Not on file   Social Connections:     Frequency of Communication with Friends and Family: Not on file    Frequency of Social Gatherings with Friends and Family: Not on file    Attends Scientologist Services: Not on file    Active Member of [address] [place] [address] or Organizations: Not on file    Attends Club or Organization Meetings: Not on file    Marital Status: Not on file   Intimate Partner Violence:     Fear of Current or Ex-Partner: Not on file    Emotionally Abused: Not on file    Physically Abused: Not on file    Sexually Abused: Not on file   Housing Stability:     Unable to Pay for Housing in the Last Year: Not on file    Number of [place] in the Last Year: Not on file    Unstable Housing in the Last Year: Not on file       Past Surgical History:   Procedure Laterality Date    HX COLONOSCOPY      HX HERNIA REPAIR      abdominal with mesh    HX WISDOM TEETH EXTRACTION         No family history on file. REVIEW OF SYSTEMS:  ROS     Positive for: Musculoskeletal    Last edited by Shea Johnson on 6/3/2022  2:31 PM. (History)        Patient denies any recent fever, chills, nausea, vomiting, chest pain, or shortness of breath. Vitals:  Ht 5' 11\" (1.803 m)   Wt 210 lb (95.3 kg)   BMI 29.29 kg/m²    Body mass index is 29.29 kg/m². PHYSICAL EXAM:  General exam: Patient is awake, alert, and oriented x3. Well-appearing. No acute distress. Ambulates with an antalgic gait    Right knee: Neurovascular and sensory intact. Incisions well-healed with no signs of erythema or drainage. Mild swelling and ecchymosis.   There is some limitation in range of motion due to swelling. Normal stability. IMAGING:    XR Results (most recent):  No results found for this or any previous visit. Orders Placed This Encounter    REFERRAL TO PHYSICAL THERAPY     Referral Priority:   Routine     Referral Type:   PT/OT/ST     Referral Reason:   Specialty Services Required     Number of Visits Requested:   1              An electronic signature was used to authenticate this note.   -- Jes Jon DO

## 2022-06-03 NOTE — LETTER
6/3/2022    Patient: Elida Chandra   YOB: 1970   Date of Visit: 6/3/2022     [address]  [address] [id number]  Via Fax: [phone]    Dear Kapil Egan DO,      Thank you for referring Mr. Elida Chandra to [facility] for evaluation. My notes for this consultation are attached. If you have questions, please do not hesitate to call me. I look forward to following your patient along with you.       Sincerely,    Benjamín Franco DO

## 2022-06-09 ENCOUNTER — HOME HEALTH ADMISSION (OUTPATIENT)
Dept: HOME HEALTH SERVICES | Facility: HOME HEALTH | Age: 52
End: 2022-06-09

## 2022-06-12 ENCOUNTER — HOME CARE VISIT (OUTPATIENT)
Dept: HOME HEALTH SERVICES | Facility: HOME HEALTH | Age: 52
End: 2022-06-12

## 2022-06-13 ENCOUNTER — TELEPHONE (OUTPATIENT)
Dept: DIABETES SERVICES | Age: 52
End: 2022-06-13

## 2022-06-13 DIAGNOSIS — G89.18 POST-OP PAIN: Primary | ICD-10-CM

## 2022-06-13 RX ORDER — HYDROMORPHONE HYDROCHLORIDE 2 MG/1
4 TABLET ORAL
Qty: 20 TABLET | Refills: 0 | Status: SHIPPED | OUTPATIENT
Start: 2022-06-13 | End: 2022-06-20

## 2022-06-14 ENCOUNTER — TELEPHONE (OUTPATIENT)
Dept: DIABETES SERVICES | Age: 52
End: 2022-06-14

## 2022-06-16 ENCOUNTER — OFFICE VISIT (OUTPATIENT)
Dept: ORTHOPEDIC SURGERY | Age: 52
End: 2022-06-16
Payer: COMMERCIAL

## 2022-06-16 DIAGNOSIS — S83.511D COMPLETE TEAR OF RIGHT ACL, SUBSEQUENT ENCOUNTER: Primary | ICD-10-CM

## 2022-06-16 DIAGNOSIS — M25.561 ACUTE POSTOPERATIVE PAIN OF RIGHT KNEE: ICD-10-CM

## 2022-06-16 DIAGNOSIS — G89.18 ACUTE POSTOPERATIVE PAIN OF RIGHT KNEE: ICD-10-CM

## 2022-06-16 DIAGNOSIS — S83.411D COMPLETE TEAR OF MCL OF KNEE, RIGHT, SUBSEQUENT ENCOUNTER: ICD-10-CM

## 2022-06-16 PROCEDURE — 97162 PT EVAL MOD COMPLEX 30 MIN: CPT | Performed by: PHYSICAL THERAPIST

## 2022-06-16 PROCEDURE — 97110 THERAPEUTIC EXERCISES: CPT | Performed by: PHYSICAL THERAPIST

## 2022-06-16 PROCEDURE — 97112 NEUROMUSCULAR REEDUCATION: CPT | Performed by: PHYSICAL THERAPIST
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
## 2022-06-17 ENCOUNTER — VIRTUAL VISIT (OUTPATIENT)
Dept: DIABETES SERVICES | Age: 52
End: 2022-06-17
Payer: COMMERCIAL

## 2022-06-17 DIAGNOSIS — E11.65 TYPE 2 DIABETES MELLITUS WITH HYPERGLYCEMIA, UNSPECIFIED WHETHER LONG TERM INSULIN USE (HCC): Primary | ICD-10-CM

## 2022-06-17 PROCEDURE — G0108 DIAB MANAGE TRN  PER INDIV: HCPCS

## 2022-06-17 NOTE — PROGRESS NOTES
[facility]  Diabetes Self-Management Education & Support Program  Post-program Evaluation    EVALUATION @ COMPLETION OF THE PROGRAM    Reyes Starks completed 9 hours of diabetes self-management education. The participant acquired new knowledge and demonstrated new skills during the program.     The participant worked on the following SMART GOAL(s) to improve their diabetes self-management:  senso   1. Increase Nutrition by limiting diet sodas to 7 per day, everyday this week  ACHIEVEMENT OF GOAL(S) : %    The participant's pre-program A1c was 11.4 on 12/11/2021. The post-evaluation A1c is unknown. The participant improved their Diabetes Skills, Confidence and Preparedness Index (scored out of 7): Total score: 6.2  Skills: 5.9   Confidence: 6.0  Preparedness: 6.7    FINAL RECOMMENDATIONS:  Mr. Maury Pedersen actively participated in audio appointment and stated that he is currently recovering from knee surgery. He stated he is currently prescribed 12 weeks of PT at home and he has had support from healthcare professionals and friends during his recovery. He stated he has been following ADA guidelines for healthy meals and utilizing CGM Loopt Larissa 2 to monitor BG. Reported BG range 150-340 mg/dL. He stated he needs a new sensor. Encouraged participant to communicate with provider/insurance to obtain CGM sensor. He reported taking all medications as prescribed.        Next provider visit is scheduled for: 6/20/22     Metric Patient's responses (6/17/2022) Areas for improvement     Healthy Eating       The participant is using the Healthy Plate to control carbohydrate intake - Yes    The participant reads food labels accurately - Yes          Being Active       The participant performs physical activity where your heart beats faster and your breathing is harder than normal for 30 minutes or more?  0 day(s)     In a typical week, the participant performs physical activity 0 day(s) Encouraged participant to follow provider/scheduled PT recommendations/restrictions      Monitoring   The participant is monitoring their blood sugars? Yes    The participant is monitoring >5x/day-CGM    Blood sugar ranges are 150-340 mg/dL     The participant improved their A1c - unknown    The participant understands the meaning of the A1c - Yes   Encouraged participant to communicate with provider and insurance to obtain CGM sensor. Encouraged participant to share CGM data with provider. Encouraged participant to maintain BG in target to promote healing and reduce risk of infection. A1c DUE       Taking Medications   The participant understands what their diabetes medications do Yes    The participant can afford your diabetes medications Yes    The participant does not miss doses of their diabetes medications - has not missed taking DM meds           Healthy Coping     The participant feels supported by family, friends and others related to their diabetes self-care practices - Yes    The participant plans on utilizing the following community resources after completion of the program: websites, friends, behavioral health counselor, support groups        Reducing Risks   Vaccines:  Influenza: There is no immunization history on file for this patient. Pneumococcal:   There is no immunization history on file for this patient. Hepatitis:   There is no immunization history on file for this patient. Participant reported all immunizations/vaccines current.      Examinations:  Diabetic Foot and Eye Exam HM Status   Topic Date Due    Diabetic Foot Care  Never done    Eye Exam  Never done      Eye exam: particpant reported all exams current    Dental exam: partcipant reported all exams current    Foot exam: participant reported all exams current    Heart Protection:  BP Readings from Last 2 Encounters:   05/25/22 127/84   10/11/20 (!) 146/82        No results found for: LDL, LDLC, DLDLP     Key Anti-Platelet Anticoagulant Meds             aspirin delayed-release 81 mg tablet Take 81 mg by mouth daily. Indications: FOR GOOD HEALTH           Kidney Protection:  No results found for: Aida Brewer, MCA2, MCA3, MCAU, MCAU2, MCALPOCT   The participant would benefit from additional attention to:    Vaccines:  [] Influenza  [] Pneumococcal  [] Hepatitis    Examinations:  [] Dilated eye exam  [] Dental exam  [] Foot exam    Other:  [] Reviewing long-term complications     Problem Solving   Hypoglycemia Management:  The participant knows the signs and symptoms of hypoglycemia: Shaking/trembling, Nausea/queasiness    The participant knows how to prevent hypoglycemia: consistent meals/snack time and take medications as instructed    The participant knows how to treat hypoglycemia: Rule of 15    Hyperglycemia Management:  The participant knows their signs and symptoms of hyperglycemia: Extreme thirst, Frequent urination     The participant knows how to prevent hyperglycemia: take medications as instructed and focus on carbohydrate counting/meal planning    Sick Day Management:  The participant knows what to do differently on sick days: Stay hydrated, Call MD     Pattern Management:  The participant can notice blood glucose patterns when looking at their blood glucose readings: Yes    How do you use the blood glucose readings from your meter or logbook: understand how body responds to meals      Note: Content derived from the American Association of Diabetes Educators' Diabetes Education Curriculum: A Guide to Successful Self-Management (3rd edition)    Jonathan Fam RN on 6/17/2022   I have personally reviewed the health record, including provider notes, laboratory data and current medications before making these care and education recommendations. The time spent in this effort is included in the total time. Total minutes: Yelena was evaluated through a synchronous (real-time) audio-video encounter.  The patient (or guardian if applicable) is aware that this is a billable service, which includes applicable co-pays. This Virtual Visit was conducted with patient's (and/or legal guardian's) consent. The visit was conducted pursuant to the emergency declaration under the [address], [address] [place] [address] authority and the [employer] General Act. Patient identification was verified, and a caregiver was present when appropriate. The patient was located at: Home: [facility] [id number]  The provider was located at: Facility (Appt Department): [address] [place] [address] [employer]    Diabetes Skills, Confidence & Preparedness Index (SCPI) ©  Thank you for completing the Skills, Confidence & Preparedness Index! Below are your scores. All scales and questions are out of 7. If you would like these results emailed, please enter your email address along with some identifying patient information. Email:    Patient Identifier:   Overall SCPI score: 6.2 Skills Score: 5.9  Low: Blood Sugar Monitoring(Q4) Confidence Score: 6.0  Low: Healthy Eating(Q1),Healthy Coping(Q2),Reducing Risks(Q3),Healthy Eating(Q4),Being Active(Q5),Blood Sugar Monitoring(Q6),Problem XBOBAYU(J4) Preparedness Score: 6.7  Low: Reducing Risks(Q4),Taking Medication(Q5)  Healthy Eating Score: 6.3  Low: Skills(Q1),Confidence(Q1),Confidence(Q4) Taking Medication Score: 6.0  Low: Skills(Q2),Preparedness(Q5) Blood Sugar Monitoring Score: 6.0  Low: FLKVUC(L7) Reducing Risks Score: 6.0  Low: Skills(Q5),Skills(Q9),Confidence(Q3),Preparedness(Q4)  Problem Solving Score: 6.3  Low: Skills(Q6),Confidence(Q7) Healthy Coping Score: 6.3  Low: Skills(Q7),Confidence(Q2) Being Active Score: 6.5  Low: Confidence(Q5)  Skills/Knowledge Questions  1. I know how to plan meals that have the best balance between carbohydrates, proteins and vegetables. 6  2.  I know how my diabetes medications (pills, injectables and/or insulin) work in my body. 6  3. I know when to check my blood sugar if I want to see how my body responded to a meal. 6  4. I know when to check my blood sugars to determine if my medication or insulin doses are correct. 5  5. I know what to do to prevent a low blood sugar when I exercise (either before, during, or after). 6  6. When I am sick, I know what to do differently with my diabetes management. 6  7. I know how stress can affect my diabetes management. 6  8. When I look at my blood sugars over a given week, I can explain what my blood sugar pattern is. 6  9. I know what my target levels are for A1c, blood pressure and cholesterol. 6  Confidence Questions  1. I am confident that I can plan balanced meals and snacks. 6  2. I am confident that I can manage my stress. 6  3. I am confident that I can prevent a low blood sugar during or after exercise. 6  4. I am confident that the next time I eat out, I will be able to choose foods that best keep my blood sugars in target. 6  5. I am confident I can include exercise into my schedule. 6  6. I am confident that I can use my daily blood sugars to adjust my diet, my activity, and/or my insulin. 6  7. When something out of my normal routine happens, I am confident that I can problem-solve and keep my diabetes on track. 6  Preparedness Questions  1. Within the next month, I will begin to eat more balanced meals and snacks. 8  2. Within the next month, I will choose an exercise activity and I will start fitting it into my schedule. 8  3. Within the next month, I will make a list of stress management options that work for me. 8  4. Within the next month, I will consistently plan ahead to prevent low blood sugars. 6  5. Within the next month, I will start adjusting my insulin doses on my own. 6  6.  Within the next month, I will begin making changes to my diabetes management based on my daily blood sugars (eg - eating, activity and/or insulin). 8  7. Within the next month, I will begin making changes to my diabetes management to meet my overall goals (eg - eating, activity and/or insulin).  8

## 2022-06-20 ENCOUNTER — OFFICE VISIT (OUTPATIENT)
Dept: ORTHOPEDIC SURGERY | Age: 52
End: 2022-06-20
Payer: COMMERCIAL

## 2022-06-20 DIAGNOSIS — M25.561 ACUTE POSTOPERATIVE PAIN OF RIGHT KNEE: ICD-10-CM

## 2022-06-20 DIAGNOSIS — G89.18 ACUTE POSTOPERATIVE PAIN OF RIGHT KNEE: ICD-10-CM

## 2022-06-20 DIAGNOSIS — S83.411D COMPLETE TEAR OF MCL OF KNEE, RIGHT, SUBSEQUENT ENCOUNTER: ICD-10-CM

## 2022-06-20 DIAGNOSIS — Z98.890 STATUS POST ARTHROSCOPY OF KNEE: ICD-10-CM

## 2022-06-20 DIAGNOSIS — S83.511D COMPLETE TEAR OF RIGHT ACL, SUBSEQUENT ENCOUNTER: Primary | ICD-10-CM

## 2022-06-20 PROCEDURE — 97140 MANUAL THERAPY 1/> REGIONS: CPT

## 2022-06-20 PROCEDURE — 97110 THERAPEUTIC EXERCISES: CPT

## 2022-06-20 NOTE — PROGRESS NOTES
I have reviewed the notes, assessments, and/or procedures performed by Giovanny Villalpando PTA, I concur with her/his documentation of Remberto Rosa.

## 2022-06-20 NOTE — PROGRESS NOTES
PT DAILY TREATMENT NOTE    Patient Name: Marielos More  Date:2022  : 1970  [x]  Patient  Verified  Payor: Rowdy Fontanez / Plan:  [facility] / Product Type: PPO /    Total Treatment Time (min): 55  1:1 Treatment Time ([facility] only): 55   Referring Provider: Jovita Lira DO    1. Complete tear of right ACL, subsequent encounter  2. Complete tear of MCL of knee, right, subsequent encounter  3. Acute postoperative pain of right knee  4. Status post arthroscopy of knee      SUBJECTIVE  Subjective functional status/changes:   [] No changes reported    Patient reports he was sore later in the day following his IE. He states he has been doing his HEP with no pain or problems. He states he is not wearing his brace around the house except to go up/down the steps. OBJECTIVE/TREATMENT    Manual Therapy x 30 mins:   PF/TF mobilizations to affected limb to promote improved joint mobility. PROM for knee flexion and extension mobility. STM/MFR to the distal IT band, quadriceps, peripatellar structures and popliteal musculature. Passive quad and hamstring stretching in supine and with leg off table. Gentle incisional mobilizations. Neuromuscular Re-education (NMR) x  minutes:  [x]  Kinesiotaping applied in a peripatellar \"Y\" pattern to promote lymphatic drainage. []  Neuromuscular reeducation to the VMO with use of [place] electrical stimulation in conjunction with active contraction and exercises. []  balance/proprioceptive exercises and activities in clinic as listed below as NMR. Therapeutic Exercise x 25 mins:   Strengthening/Endurance/ADL function/Neuromuscular reeducation activities/exercises supervised and completed as listed below. EXERCISE X= completed on  2022   NuStep Man.  10' (1)   slantboard x   LAQ/SAQ 4#/2#                                                                       Added/Changed Exercises:  []  Advanced to address: [] functional strength/ROM deficits [] balance/proprioceptive tasks  []  Modified: [] per subjective reports [] for patient time constraints [] for clinic time constraints    Modality:  []  E-Stim: type _ x _ min     []att   []unatt   []w/ice   []w/heat  []  Ultrasound: []cont   []pulse    _ W/cm2 x _  min   []1MHz   []3MHz  []  Ice pack: post      []  Hot pack: pre    []  Other:     Patient Education: [] Review HEP    [] Progressed/Changed HEP to include:  [] positioning   [] body mechanics   [] transfers   [] heat/ice application      ASSESSMENT    Making steady gain in ROM but still quite stiff with knee flexion. Sore with all mobilizations and exercises today in clinic.      Progress towards goals / Updated goals:    PLAN  [x]  Upgrade activities as tolerated      [x]  Continue plan of care  []  Discharge due to:  [] Other:      Co-treatment by Monia Osler, PTA 6/20/2022

## 2022-06-23 ENCOUNTER — OFFICE VISIT (OUTPATIENT)
Dept: ORTHOPEDIC SURGERY | Age: 52
End: 2022-06-23
Payer: COMMERCIAL

## 2022-06-23 DIAGNOSIS — G89.18 ACUTE POSTOPERATIVE PAIN OF RIGHT KNEE: ICD-10-CM

## 2022-06-23 DIAGNOSIS — M25.561 ACUTE POSTOPERATIVE PAIN OF RIGHT KNEE: ICD-10-CM

## 2022-06-23 DIAGNOSIS — S83.511D COMPLETE TEAR OF RIGHT ACL, SUBSEQUENT ENCOUNTER: Primary | ICD-10-CM

## 2022-06-23 DIAGNOSIS — S83.411D COMPLETE TEAR OF MCL OF KNEE, RIGHT, SUBSEQUENT ENCOUNTER: ICD-10-CM

## 2022-06-23 PROCEDURE — 97112 NEUROMUSCULAR REEDUCATION: CPT | Performed by: PHYSICAL THERAPIST

## 2022-06-23 PROCEDURE — 97110 THERAPEUTIC EXERCISES: CPT | Performed by: PHYSICAL THERAPIST

## 2022-06-23 PROCEDURE — 97140 MANUAL THERAPY 1/> REGIONS: CPT | Performed by: PHYSICAL THERAPIST

## 2022-06-23 NOTE — PROGRESS NOTES
PT DAILY TREATMENT NOTE    Patient Name: Real Cordova  Date:2022  : 1970  [x]  Patient  Verified  Payor: Anabell Gabo / Plan: [facility] / Product Type: PPO /    Total Treatment Time (min): 60+  1:1 Treatment Time ( [address] only):    Referring Provider: Aaron Tate, DO    1. Complete tear of right ACL, subsequent encounter  2. Complete tear of MCL of knee, right, subsequent encounter  3. Acute postoperative pain of right knee      SUBJECTIVE  Subjective functional status/changes:   [] No changes reported    Expected soreness after last session. Verbalizes compliance with home exercise program.  Patient has questions [place] is helpful for swelling. OBJECTIVE/TREATMENT    Manual Therapy x 30 mins:   PF/TF mobilizations to affected limb to promote improved joint mobility. PROM for knee flexion and extension mobility. STM/MFR to the distal IT band, quadriceps, peripatellar structures and popliteal musculature. Gentle incisional mobilizations. Neuromuscular Re-education (NMR) x 15 minutes:  [x]  Kinesiotaping applied in a peripatellar \"Y\" pattern to promote lymphatic drainage. [x]  Neuromuscular reeducation to the VMO with use of [place] electrical stimulation in conjunction with active contraction and exercises. []  balance/proprioceptive exercises and activities in clinic as listed below as NMR. Therapeutic Exercise x 15 mins:   Strengthening/Endurance/ADL function/Neuromuscular reeducation activities/exercises supervised and completed as listed below. Passive quad and hamstring stretching in supine and with leg off table. EXERCISE X= completed on  2022   NuStep Man.  10' (1)   slantboard x   LAQ/SAQ    TKE green   wtshift foam x                                                             Advised patient against getting his knee into the water over the weekend as he still does have some open areas along his incision lines. Advise caution with walking in the sand as well. Range of motion 0 to 68 degrees following extensive neuromuscular techniques, manual treatment, and passive exerciseAdded/Changed Exercises:  []  Advanced to address: [] functional strength/ROM deficits [] balance/proprioceptive tasks  []  Modified: [] per subjective reports [] for patient time constraints [] for clinic time constraints    Modality:  []  E-Stim: type _ x _ min     []att   []unatt   []w/ice   []w/heat  []  Ultrasound: []cont   []pulse    _ W/cm2 x _  min   []1MHz   []3MHz  []  Ice pack: post      []  Hot pack: pre    []  Other:     Patient Education: [] Review HEP    [] Progressed/Changed HEP to include:  [] positioning   [] body mechanics   [] transfers   [] heat/ice application      ASSESSMENT    Still with considerable quad weakness and limitations in full mobility to include both flexion and extension.     PLAN  [x]  Upgrade activities as tolerated      [x]  Continue plan of care  []  Discharge due to:  [] Other:       Tasha Rey, PT, DPT 6/23/2022

## 2022-06-27 ENCOUNTER — OFFICE VISIT (OUTPATIENT)
Dept: ORTHOPEDIC SURGERY | Age: 52
End: 2022-06-27
Payer: COMMERCIAL

## 2022-06-27 DIAGNOSIS — Z98.890 STATUS POST ARTHROSCOPY OF KNEE: ICD-10-CM

## 2022-06-27 DIAGNOSIS — S83.411D COMPLETE TEAR OF MCL OF KNEE, RIGHT, SUBSEQUENT ENCOUNTER: ICD-10-CM

## 2022-06-27 DIAGNOSIS — G89.18 ACUTE POSTOPERATIVE PAIN OF RIGHT KNEE: ICD-10-CM

## 2022-06-27 DIAGNOSIS — S83.511D COMPLETE TEAR OF RIGHT ACL, SUBSEQUENT ENCOUNTER: Primary | ICD-10-CM

## 2022-06-27 DIAGNOSIS — M25.561 ACUTE POSTOPERATIVE PAIN OF RIGHT KNEE: ICD-10-CM

## 2022-06-27 PROCEDURE — 97112 NEUROMUSCULAR REEDUCATION: CPT

## 2022-06-27 PROCEDURE — 97110 THERAPEUTIC EXERCISES: CPT

## 2022-06-27 PROCEDURE — 97140 MANUAL THERAPY 1/> REGIONS: CPT

## 2022-06-27 NOTE — PROGRESS NOTES
I have reviewed the notes, assessments, and/or procedures performed by Jason Leigh PTA, I concur with her/his documentation of Elida Chandra.

## 2022-06-27 NOTE — PROGRESS NOTES
PT DAILY TREATMENT NOTE    Patient Name: Steven Hargrove  Date:2022  : 1970  [x]  Patient  Verified  Payor: Chelsea Ramirez / Plan:  [facility] / Product Type: PPO /    Total Treatment Time (min): 60+  1:1 Treatment Time ( [address] only):    Referring Provider: Ligia Lawrence DO    1. Complete tear of right ACL, subsequent encounter  2. Complete tear of MCL of knee, right, subsequent encounter  3. Acute postoperative pain of right knee  4. Status post arthroscopy of knee      SUBJECTIVE  Subjective functional status/changes:   [] No changes reported    Patient reports he did a lot of walking over the weekend and has been quite sore since. OBJECTIVE/TREATMENT    ROM 0-70 after manual stretching    Manual Therapy x 30 mins:   PF/TF mobilizations to affected limb to promote improved joint mobility. PROM for knee flexion and extension mobility. STM/MFR to the distal IT band, quadriceps, peripatellar structures and popliteal musculature. Incisional mobilizations. Neuromuscular Re-education (NMR) x 15 minutes:  [x]  Kinesiotaping applied in a peripatellar \"Y\" pattern to promote lymphatic drainage. [x]  Neuromuscular reeducation to the VMO with use of [place] electrical stimulation in conjunction with active contraction and exercises. []  balance/proprioceptive exercises and activities in clinic as listed below as NMR. Therapeutic Exercise x 15 mins:   Strengthening/Endurance/ADL function/Neuromuscular reeducation activities/exercises supervised and completed as listed below. Passive quad and hamstring stretching in supine and with leg off table. EXERCISE X= completed on  2022   NuStep Man.  10' (1)   slantboard x   LAQ/SAQ 2/4   TKE green   wtshift foam x   HS curls 2#   Cups *NMR x                                                       Added/Changed Exercises:  [x]  Advanced to address: [x] functional strength/ROM deficits [x] balance/proprioceptive tasks  []  Modified: [] per subjective reports [] for patient time constraints [] for clinic time constraints    Modality:  []  E-Stim: type _ x _ min     []att   []unatt   []w/ice   []w/heat  []  Ultrasound: []cont   []pulse    _ W/cm2 x _  min   []1MHz   []3MHz  []  Ice pack: post      []  Hot pack: pre    []  Other:     Patient Education: [] Review HEP    [] Progressed/Changed HEP to include:  [] positioning   [] body mechanics   [] transfers   [] heat/ice application      ASSESSMENT    Continued to encourage to push ROM stretching at home to improve flexion ROM as he is still very stiff. He is able to demonstrate better active knee extension during gait with VCing.      PLAN  [x]  Upgrade activities as tolerated      [x]  Continue plan of care  []  Discharge due to:  [] Other:       Lila Alexis, PTA 6/27/2022

## 2022-06-30 ENCOUNTER — OFFICE VISIT (OUTPATIENT)
Dept: ORTHOPEDIC SURGERY | Age: 52
End: 2022-06-30
Payer: COMMERCIAL

## 2022-06-30 DIAGNOSIS — S83.511D COMPLETE TEAR OF RIGHT ACL, SUBSEQUENT ENCOUNTER: Primary | ICD-10-CM

## 2022-06-30 DIAGNOSIS — M25.561 ACUTE POSTOPERATIVE PAIN OF RIGHT KNEE: ICD-10-CM

## 2022-06-30 DIAGNOSIS — S83.411D COMPLETE TEAR OF MCL OF KNEE, RIGHT, SUBSEQUENT ENCOUNTER: ICD-10-CM

## 2022-06-30 DIAGNOSIS — G89.18 ACUTE POSTOPERATIVE PAIN OF RIGHT KNEE: ICD-10-CM

## 2022-06-30 PROCEDURE — 97140 MANUAL THERAPY 1/> REGIONS: CPT | Performed by: PHYSICAL THERAPIST

## 2022-06-30 PROCEDURE — 97112 NEUROMUSCULAR REEDUCATION: CPT | Performed by: PHYSICAL THERAPIST

## 2022-06-30 PROCEDURE — 97110 THERAPEUTIC EXERCISES: CPT | Performed by: PHYSICAL THERAPIST

## 2022-06-30 NOTE — PROGRESS NOTES
PT DAILY TREATMENT NOTE    Patient Name: Marielos oMre  Date:2022  : 1970  [x]  Patient  Verified  Payor: Rowdy Fontanez / Plan:  [facility] / Product Type: PPO /    Total Treatment Time (min): 60+  1:1 Treatment Time ([facility] only):    Referring Provider: Jovita Lira DO    1. Complete tear of right ACL, subsequent encounter  2. Complete tear of MCL of knee, right, subsequent encounter  3. Acute postoperative pain of right knee      SUBJECTIVE  Subjective functional status/changes:   [] No changes reported  Patient aware of his stiffness in the knee but states he is proud of the progress he has made since he started physical therapy. OBJECTIVE/TREATMENT    ROM 0-75 after manual stretching    Manual Therapy x 15 mins:   PF/TF mobilizations to affected limb to promote improved joint mobility. PROM for knee flexion and extension mobility. STM/MFR to the distal IT band, quadriceps, peripatellar structures and popliteal musculature. Incisional mobilizations. Neuromuscular Re-education (NMR) x 15 minutes:  []  Kinesiotaping applied in a peripatellar \"Y\" pattern to promote lymphatic drainage. [x]  Neuromuscular reeducation to the VMO with use of [place] electrical stimulation in conjunction with active contraction and exercises. []  balance/proprioceptive exercises and activities in clinic as listed below as NMR. Verbal/tactile cueing for isometric quad contraction    Therapeutic Exercise x 30 mins:   Strengthening/Endurance/ADL function/Neuromuscular reeducation activities/exercises supervised and completed as listed below. Passive quad and hamstring flexibility exercises in supine and with leg off table. EXERCISE X= completed on  2022   NuStep Man.  10' (1)   slantboard x   LAQ/SAQ 2/4   TKE green   wtshift foam x   HS curls 2#   Cups *NMR x   SLR 1#   TG minisquat Level 16                                               Added/Changed Exercises:  [x]  Advanced to address: [x] functional strength/ROM deficits [x] balance/proprioceptive tasks  []  Modified: [] per subjective reports [] for patient time constraints [] for clinic time constraints    Modality:  []  E-Stim: type _ x _ min     []att   []unatt   []w/ice   []w/heat  []  Ultrasound: []cont   []pulse    _ W/cm2 x _  min   []1MHz   []3MHz  []  Ice pack: post      []  Hot pack: pre    []  Other:     Patient Education: [] Review HEP    [] Progressed/Changed HEP to include:  [] positioning   [] body mechanics   [] transfers   [] heat/ice application      ASSESSMENT    Shows some gradual improvements in objective flexion today as compared to earlier in the week but still with clear postoperative stiffness.     PLAN  [x]  Upgrade activities as tolerated      [x]  Continue plan of care  []  Discharge due to:  [] Other:       Leida Rivas, PT, DPT 6/30/2022

## 2022-07-05 ENCOUNTER — OFFICE VISIT (OUTPATIENT)
Dept: ORTHOPEDIC SURGERY | Age: 52
End: 2022-07-05
Payer: COMMERCIAL

## 2022-07-05 DIAGNOSIS — M25.561 ACUTE POSTOPERATIVE PAIN OF RIGHT KNEE: ICD-10-CM

## 2022-07-05 DIAGNOSIS — G89.18 ACUTE POSTOPERATIVE PAIN OF RIGHT KNEE: ICD-10-CM

## 2022-07-05 DIAGNOSIS — Z98.890 STATUS POST ARTHROSCOPY OF KNEE: ICD-10-CM

## 2022-07-05 DIAGNOSIS — S83.411D COMPLETE TEAR OF MCL OF KNEE, RIGHT, SUBSEQUENT ENCOUNTER: ICD-10-CM

## 2022-07-05 DIAGNOSIS — S83.511D COMPLETE TEAR OF RIGHT ACL, SUBSEQUENT ENCOUNTER: Primary | ICD-10-CM

## 2022-07-05 PROCEDURE — 97112 NEUROMUSCULAR REEDUCATION: CPT

## 2022-07-05 PROCEDURE — 97140 MANUAL THERAPY 1/> REGIONS: CPT

## 2022-07-05 PROCEDURE — 97110 THERAPEUTIC EXERCISES: CPT

## 2022-07-05 NOTE — PROGRESS NOTES
PT DAILY TREATMENT NOTE    Patient Name: Ebony Mehta  Date:2022  : 1970  [x]  Patient  Verified  Payor: Billie Lara / Plan:  [facility] / Product Type: PPO /    Total Treatment Time (min): 60+  1:1 Treatment Time ( [address] only):    Referring Provider: Vicki Vides, DO    1. Complete tear of right ACL, subsequent encounter  2. Complete tear of MCL of knee, right, subsequent encounter  3. Acute postoperative pain of right knee  4. Status post arthroscopy of knee      SUBJECTIVE  Subjective functional status/changes:   [] No changes reported    Patient reports he was able to go for a short hike yesterday and is able to go up steps with an alternating step pattern. He states he is still not able to pedal his recumbent bike. OBJECTIVE/TREATMENT    ROM 0-77 with manual overpressure    Manual Therapy x 15 mins:   PF/TF mobilizations to affected limb to promote improved joint mobility. PROM for knee flexion and extension mobility. STM/MFR to the distal IT band, quadriceps, peripatellar structures and popliteal musculature. Incisional mobilizations. Neuromuscular Re-education (NMR) x 15 minutes:  []  Kinesiotaping applied in a peripatellar \"Y\" pattern to promote lymphatic drainage. [x]  Neuromuscular reeducation to the VMO with use of [place] electrical stimulation in conjunction with active contraction and exercises. []  balance/proprioceptive exercises and activities in clinic as listed below as NMR. Verbal/tactile cueing for isometric quad contraction    Therapeutic Exercise x 30 mins:   Strengthening/Endurance/ADL function/Neuromuscular reeducation activities/exercises supervised and completed as listed below. Passive quad and hamstring flexibility exercises in supine and with leg off table. EXERCISE X= completed on  2022   Aren Man.  10' (1)   slantboard x   LAQ/SAQ 2/4   TKE green   wtshift foam x   HS curls 2#   Cups *NMR x   SLR 1#   TG minisquat Level 16 Added/Changed Exercises:  []  Advanced to address: [] functional strength/ROM deficits [] balance/proprioceptive tasks  []  Modified: [] per subjective reports [] for patient time constraints [] for clinic time constraints    Modality:  []  E-Stim: type _ x _ min     []att   []unatt   []w/ice   []w/heat  []  Ultrasound: []cont   []pulse    _ W/cm2 x _  min   []1MHz   []3MHz  []  Ice pack: post      []  Hot pack: pre    []  Other:     Patient Education: [] Review HEP    [] Progressed/Changed HEP to include:  [] positioning   [] body mechanics   [] transfers   [] heat/ice application      ASSESSMENT    Flexion gains are slow but steady. He does have less knee pain with SAQ/LAQ exercises today as compared to previous visits.      PLAN  [x]  Upgrade activities as tolerated      [x]  Continue plan of care  []  Discharge due to:  [] Other:       Keenan Tomlinson, PTA 7/5/2022

## 2022-07-05 NOTE — PROGRESS NOTES
I have reviewed the notes, assessments, and/or procedures performed by Sravanthi Hewitt PTA, I concur with her/his documentation of Vera Chavis.

## 2022-07-07 ENCOUNTER — OFFICE VISIT (OUTPATIENT)
Dept: ORTHOPEDIC SURGERY | Age: 52
End: 2022-07-07
Payer: COMMERCIAL

## 2022-07-07 DIAGNOSIS — S83.411D COMPLETE TEAR OF MCL OF KNEE, RIGHT, SUBSEQUENT ENCOUNTER: ICD-10-CM

## 2022-07-07 DIAGNOSIS — S83.511D COMPLETE TEAR OF RIGHT ACL, SUBSEQUENT ENCOUNTER: Primary | ICD-10-CM

## 2022-07-07 DIAGNOSIS — G89.18 ACUTE POSTOPERATIVE PAIN OF RIGHT KNEE: ICD-10-CM

## 2022-07-07 DIAGNOSIS — M25.561 ACUTE POSTOPERATIVE PAIN OF RIGHT KNEE: ICD-10-CM

## 2022-07-07 PROCEDURE — 97110 THERAPEUTIC EXERCISES: CPT | Performed by: PHYSICAL THERAPIST

## 2022-07-07 PROCEDURE — 97140 MANUAL THERAPY 1/> REGIONS: CPT | Performed by: PHYSICAL THERAPIST

## 2022-07-07 PROCEDURE — 97112 NEUROMUSCULAR REEDUCATION: CPT | Performed by: PHYSICAL THERAPIST

## 2022-07-07 NOTE — PROGRESS NOTES
PT DAILY TREATMENT NOTE    Patient Name: Lizbet Palacio  Date:2022  : 1970  [x]  Patient  Verified  Payor: Jesu Sharad / Plan:  [facility] / Product Type: PPO /    Total Treatment Time (min): 60+  1:1 Treatment Time ( [address] only):    Referring Provider: Raz Bar DO    1. Complete tear of right ACL, subsequent encounter  2. Complete tear of MCL of knee, right, subsequent encounter  3. Acute postoperative pain of right knee      SUBJECTIVE  Subjective functional status/changes:   [] No changes reported    Frustrated with continued stiffness in the knee. OBJECTIVE/TREATMENT    ROM 0-80 with manual overpressure    Manual Therapy x 15 mins:   PF/TF mobilizations to affected limb to promote improved joint mobility. PROM for knee flexion and extension mobility. STM/MFR to the distal IT band, quadriceps, peripatellar structures and popliteal musculature. Incisional mobilizations. Neuromuscular Re-education (NMR) x 15 minutes:  []  Kinesiotaping applied in a peripatellar \"Y\" pattern to promote lymphatic drainage. []  Neuromuscular reeducation to the VMO with use of [place] electrical stimulation in conjunction with active contraction and exercises. [x]  balance/proprioceptive exercises and activities in clinic as listed below as NMR. Verbal/tactile cueing for isometric quad contraction    Therapeutic Exercise x 30 mins:   Strengthening/Endurance/ADL function/Neuromuscular reeducation activities/exercises supervised and completed as listed below. Passive quad and hamstring flexibility exercises in supine and with leg off table. EXERCISE X= completed on  2022   bike Man.  10' (1)   slantboard x   LAQ/SAQ 2/4   TKE green   wtshift foam x   HS curls 2#   Cups *NMR x   SLR 1#   TG minisquat Level 16   Tandem stance (nmr) y                                           Added/Changed Exercises:  []  Advanced to address: [x] functional strength/ROM deficits [x] balance/proprioceptive tasks  []  Modified: [] per subjective reports [] for patient time constraints [] for clinic time constraints    Modality:  []  E-Stim: type _ x _ min     []att   []unatt   []w/ice   []w/heat  []  Ultrasound: []cont   []pulse    _ W/cm2 x _  min   []1MHz   []3MHz  [x]  Ice pack: post      []  Hot pack: pre    []  Other:     Patient Education: [] Review HEP    [] Progressed/Changed HEP to include:  [] positioning   [] body mechanics   [] transfers   [] heat/ice application      ASSESSMENT    Still with clear knee stiffness and quad weakness but patient continues with gradual improvements in range of motion. Shows some improvement in active quad contraction today as compared to previous visits as well with neuromuscular cueing.     PLAN  [x]  Upgrade activities as tolerated      [x]  Continue plan of care  []  Discharge due to:  [] Other:       Edna Yost, PT, DPT 7/7/2022

## 2022-07-11 ENCOUNTER — OFFICE VISIT (OUTPATIENT)
Dept: ORTHOPEDIC SURGERY | Age: 52
End: 2022-07-11
Payer: COMMERCIAL

## 2022-07-11 DIAGNOSIS — Z98.890 STATUS POST ARTHROSCOPY OF KNEE: ICD-10-CM

## 2022-07-11 DIAGNOSIS — S83.411D COMPLETE TEAR OF MCL OF KNEE, RIGHT, SUBSEQUENT ENCOUNTER: ICD-10-CM

## 2022-07-11 DIAGNOSIS — M25.561 ACUTE POSTOPERATIVE PAIN OF RIGHT KNEE: ICD-10-CM

## 2022-07-11 DIAGNOSIS — G89.18 ACUTE POSTOPERATIVE PAIN OF RIGHT KNEE: ICD-10-CM

## 2022-07-11 DIAGNOSIS — S83.511D COMPLETE TEAR OF RIGHT ACL, SUBSEQUENT ENCOUNTER: Primary | ICD-10-CM

## 2022-07-11 PROCEDURE — 97110 THERAPEUTIC EXERCISES: CPT

## 2022-07-11 PROCEDURE — 97112 NEUROMUSCULAR REEDUCATION: CPT

## 2022-07-11 PROCEDURE — 97140 MANUAL THERAPY 1/> REGIONS: CPT

## 2022-07-11 NOTE — PROGRESS NOTES
PT DAILY TREATMENT NOTE    Patient Name: Susana Pan  Date:2022  : 1970  [x]  Patient  Verified  Payor: Gomez Guard / Plan: [employer] [facility] / Product Type: PPO /    Total Treatment Time (min): 60+  1:1 Treatment Time ( [address] only):    Referring Provider: Stephanie Suggs, DO    1. Complete tear of right ACL, subsequent encounter  2. Complete tear of MCL of knee, right, subsequent encounter  3. Acute postoperative pain of right knee  4. Status post arthroscopy of knee      SUBJECTIVE  Subjective functional status/changes:   [] No changes reported    Patient reports he is walking longer distances but still has knee pain/stiffness. OBJECTIVE/TREATMENT    ROM 0-80 with manual overpressure    Manual Therapy x 15 mins:   PF/TF mobilizations to affected limb to promote improved joint mobility. PROM for knee flexion and extension mobility. STM/MFR to the distal IT band, quadriceps, peripatellar structures and popliteal musculature. Incisional mobilizations. Neuromuscular Re-education (NMR) x 15 minutes:  []  Kinesiotaping applied in a peripatellar \"Y\" pattern to promote lymphatic drainage. []  Neuromuscular reeducation to the VMO with use of [place] electrical stimulation in conjunction with active contraction and exercises. [x]  balance/proprioceptive exercises and activities in clinic as listed below as NMR. [x]  Verbal/tactile cueing for isometric quad contraction    Therapeutic Exercise x 30 mins:   Strengthening/Endurance/ADL function/Neuromuscular reeducation activities/exercises supervised and completed as listed below. Passive quad and hamstring flexibility exercises in supine and with leg off table. EXERCISE X= completed on  2022   bike Man.  10' (1)   slantboard x   LAQ/SAQ 2/4   TKE green   Lat steps w/foam x   HS curls 2#   Cups *NMR x   SLR 1#   TG minisquat Level 16   Tandem stance (nmr) x                                           Added/Changed Exercises:  []  Advanced to address: [x] functional strength/ROM deficits [] balance/proprioceptive tasks  []  Modified: [] per subjective reports [] for patient time constraints [] for clinic time constraints    Modality:  []  E-Stim: type _ x _ min     []att   []unatt   []w/ice   []w/heat  []  Ultrasound: []cont   []pulse    _ W/cm2 x _  min   []1MHz   []3MHz  [x]  Ice pack: post      []  Hot pack: pre    []  Other:     Patient Education: [] Review HEP    [] Progressed/Changed HEP to include:  [] positioning   [] body mechanics   [] transfers   [] heat/ice application      ASSESSMENT    Still with peripatellar and TF joint restrictions. Isometric quad contraction improves with extensive cuing. He does need to be diligent about pushing ROM stretching at home in addition to recreational walking.      PLAN  [x]  Upgrade activities as tolerated      [x]  Continue plan of care  []  Discharge due to:  [] Other:       Adelaide Soto, PTA 7/12/2022

## 2022-07-13 NOTE — PROGRESS NOTES
PT DAILY TREATMENT NOTE    Patient Name: Mary Kate Mello  Date:2022  : 1970  [x]  Patient  Verified  Payor: Merry Overall / Plan: [facility] / Product Type: PPO /    Total Treatment Time (min): 60+  1:1 Treatment Time ( [address] only):    Referring Provider: Adriana Ortiz DO    1. Complete tear of right ACL, subsequent encounter  2. Complete tear of MCL of knee, right, subsequent encounter  3. Acute postoperative pain of right knee  4. Status post arthroscopy of knee      SUBJECTIVE  Subjective functional status/changes:   [] No changes reported    Patient reports he has been more diligent about ROM stretching at home the past few days. OBJECTIVE/TREATMENT    ROM 0-84 with manual overpressure    Manual Therapy x 15 mins:   PF/TF mobilizations to affected limb to promote improved joint mobility. PROM for knee flexion and extension mobility. STM/MFR to the distal IT band, quadriceps, peripatellar structures and popliteal musculature. IA peripatellar and incisional mobilizations. Neuromuscular Re-education (NMR) x 15 minutes:  []  Kinesiotaping applied in a peripatellar \"Y\" pattern to promote lymphatic drainage. []  Neuromuscular reeducation to the VMO with use of [place] electrical stimulation in conjunction with active contraction and exercises. [x]  balance/proprioceptive exercises and activities in clinic as listed below as NMR. [x]  Verbal/tactile cueing for isometric quad contraction    Therapeutic Exercise x 30 mins:   Strengthening/Endurance/ADL function/Neuromuscular reeducation activities/exercises supervised and completed as listed below. Passive quad and hamstring flexibility exercises in supine and with leg off table. EXERCISE X= completed on  2022   bike Man.  10' (1)   slantboard x   LAQ/SAQ 3/5   TKE green   Lat steps w/foam x   HS curls 2#   Cups *NMR x   SLR ---   TG minisquat Level 16   Tandem stance (nmr) x   Bridges w/band grn Added/Changed Exercises:  [x]  Advanced to address: [x] functional strength/ROM deficits [] balance/proprioceptive tasks  []  Modified: [] per subjective reports [] for patient time constraints [] for clinic time constraints    Modality:  []  E-Stim: type _ x _ min     []att   []unatt   []w/ice   []w/heat  []  Ultrasound: []cont   []pulse    _ W/cm2 x _  min   []1MHz   []3MHz  [x]  Ice pack: post      []  Hot pack: pre    []  Other:     Patient Education: [] Review HEP    [] Progressed/Changed HEP to include:  [] positioning   [] body mechanics   [] transfers   [] heat/ice application      ASSESSMENT    Continues to be very tight with TF ROM. Quad strength improving and he has better mobility through available ROM.      PLAN  [x]  Upgrade activities as tolerated      [x]  Continue plan of care  []  Discharge due to:  [] Other:       Julia Claudio, PTA 7/13/2022

## 2022-07-14 ENCOUNTER — OFFICE VISIT (OUTPATIENT)
Dept: ORTHOPEDIC SURGERY | Age: 52
End: 2022-07-14
Payer: COMMERCIAL

## 2022-07-14 DIAGNOSIS — Z98.890 STATUS POST ARTHROSCOPY OF KNEE: ICD-10-CM

## 2022-07-14 DIAGNOSIS — M25.561 ACUTE POSTOPERATIVE PAIN OF RIGHT KNEE: ICD-10-CM

## 2022-07-14 DIAGNOSIS — G89.18 ACUTE POSTOPERATIVE PAIN OF RIGHT KNEE: ICD-10-CM

## 2022-07-14 DIAGNOSIS — S83.511D COMPLETE TEAR OF RIGHT ACL, SUBSEQUENT ENCOUNTER: Primary | ICD-10-CM

## 2022-07-14 DIAGNOSIS — S83.411D COMPLETE TEAR OF MCL OF KNEE, RIGHT, SUBSEQUENT ENCOUNTER: ICD-10-CM

## 2022-07-14 PROCEDURE — 97110 THERAPEUTIC EXERCISES: CPT

## 2022-07-14 PROCEDURE — 97140 MANUAL THERAPY 1/> REGIONS: CPT

## 2022-07-14 PROCEDURE — 97112 NEUROMUSCULAR REEDUCATION: CPT

## 2022-07-14 NOTE — PROGRESS NOTES
I have reviewed the notes, assessments, and/or procedures performed by Martin Hernandez PTA, I concur with her/his documentation of Olman De La Fuente.

## 2022-07-18 ENCOUNTER — OFFICE VISIT (OUTPATIENT)
Dept: ORTHOPEDIC SURGERY | Age: 52
End: 2022-07-18
Payer: COMMERCIAL

## 2022-07-18 DIAGNOSIS — Z98.890 STATUS POST ARTHROSCOPY OF KNEE: ICD-10-CM

## 2022-07-18 DIAGNOSIS — S83.411D COMPLETE TEAR OF MCL OF KNEE, RIGHT, SUBSEQUENT ENCOUNTER: ICD-10-CM

## 2022-07-18 DIAGNOSIS — S83.511D COMPLETE TEAR OF RIGHT ACL, SUBSEQUENT ENCOUNTER: Primary | ICD-10-CM

## 2022-07-18 DIAGNOSIS — G89.18 ACUTE POSTOPERATIVE PAIN OF RIGHT KNEE: ICD-10-CM

## 2022-07-18 DIAGNOSIS — M25.561 ACUTE POSTOPERATIVE PAIN OF RIGHT KNEE: ICD-10-CM

## 2022-07-18 PROCEDURE — 97110 THERAPEUTIC EXERCISES: CPT

## 2022-07-18 PROCEDURE — 97112 NEUROMUSCULAR REEDUCATION: CPT

## 2022-07-18 PROCEDURE — 97140 MANUAL THERAPY 1/> REGIONS: CPT

## 2022-07-18 NOTE — PROGRESS NOTES
PT DAILY TREATMENT NOTE    Patient Name: Geraldine Mcduffie  Date:2022  : 1970  [x]  Patient  Verified  Payor: Merline Wilman / Plan:  [facility] / Product Type: PPO /    Total Treatment Time (min): 60+  1:1 Treatment Time ( [address] only):    Referring Provider: Mike Matthews,     1. Complete tear of right ACL, subsequent encounter  2. Complete tear of MCL of knee, right, subsequent encounter  3. Acute postoperative pain of right knee  4. Status post arthroscopy of knee      SUBJECTIVE  Subjective functional status/changes:   [] No changes reported    Patient reports he is still having a significant amount of pain in the knee, especially at night with the sheets rubbing against the skin. OBJECTIVE/TREATMENT    ROM 0-88 with manual overpressure    Manual Therapy x 15 mins:   PF/TF mobilizations to affected limb to promote improved joint mobility. PROM for knee flexion and extension mobility. STM/MFR to the distal IT band, quadriceps, peripatellar structures and popliteal musculature. IA peripatellar and incisional mobilizations. Neuromuscular Re-education (NMR) x 15 minutes:  []  Kinesiotaping applied in a peripatellar \"Y\" pattern to promote lymphatic drainage. []  Neuromuscular reeducation to the VMO with use of [place] electrical stimulation in conjunction with active contraction and exercises. [x]  balance/proprioceptive exercises and activities in clinic as listed below as NMR. [x]  Verbal/tactile cueing for isometric quad contraction    Therapeutic Exercise x 30 mins:   Strengthening/Endurance/ADL function/Neuromuscular reeducation activities/exercises supervised and completed as listed below. Passive quad and hamstring flexibility exercises in supine and with leg off table. EXERCISE X= completed on  2022   bike Man.  10' (1)   slantboard x   LAQ/SAQ 3/5   TKE green   Lat steps w/foam x   HS curls 2#   Cups *NMR x   SLR ---   TG minisquat Level 16   Tandem stance (nmr) x Bridges w/band grn                                       Added/Changed Exercises:  [x]  Advanced to address: [x] functional strength/ROM deficits [] balance/proprioceptive tasks  []  Modified: [] per subjective reports [] for patient time constraints [] for clinic time constraints    Modality:  []  E-Stim: type _ x _ min     []att   []unatt   []w/ice   []w/heat  []  Ultrasound: []cont   []pulse    _ W/cm2 x _  min   []1MHz   []3MHz  [x]  Ice pack: post      []  Hot pack: pre    []  Other:     Patient Education: [] Review HEP    [] Progressed/Changed HEP to include:  [] positioning   [] body mechanics   [] transfers   [] heat/ice application      ASSESSMENT    Gain into knee flexion are gradual and he is still very stiff/restricted with TF mobility. Less painful with squad strengthening but still with clear weakness. Improved utilized of knee mobility during gait.      PLAN  [x]  Upgrade activities as tolerated      [x]  Continue plan of care  []  Discharge due to:  [] Other:       Trixie Lazo, PTA 7/18/2022

## 2022-07-18 NOTE — PROGRESS NOTES
I reviewed the notes, assessments, and/or procedures performed by Antonio Saenz PTA, I concur with her documentation of Kennedi Lackey

## 2022-07-25 ENCOUNTER — OFFICE VISIT (OUTPATIENT)
Dept: ORTHOPEDIC SURGERY | Age: 52
End: 2022-07-25
Payer: COMMERCIAL

## 2022-07-25 DIAGNOSIS — S83.511D COMPLETE TEAR OF RIGHT ACL, SUBSEQUENT ENCOUNTER: Primary | ICD-10-CM

## 2022-07-25 DIAGNOSIS — G89.18 ACUTE POSTOPERATIVE PAIN OF RIGHT KNEE: ICD-10-CM

## 2022-07-25 DIAGNOSIS — Z98.890 STATUS POST ARTHROSCOPY OF KNEE: ICD-10-CM

## 2022-07-25 DIAGNOSIS — S83.411D COMPLETE TEAR OF MCL OF KNEE, RIGHT, SUBSEQUENT ENCOUNTER: ICD-10-CM

## 2022-07-25 DIAGNOSIS — M25.561 ACUTE POSTOPERATIVE PAIN OF RIGHT KNEE: ICD-10-CM

## 2022-07-25 PROCEDURE — 97112 NEUROMUSCULAR REEDUCATION: CPT

## 2022-07-25 PROCEDURE — 97140 MANUAL THERAPY 1/> REGIONS: CPT

## 2022-07-25 PROCEDURE — 97110 THERAPEUTIC EXERCISES: CPT

## 2022-07-25 NOTE — PROGRESS NOTES
PT DAILY TREATMENT NOTE    Patient Name: Keyla Lu  Date:2022  : 1970  [x]  Patient  Verified  Payor: Donovan Kidd / Plan:  [facility] / Product Type: PPO /    Total Treatment Time (min): 60+  1:1 Treatment Time ( [address] only):    Referring Provider: Ernst Ennis DO    1. Complete tear of right ACL, subsequent encounter  2. Complete tear of MCL of knee, right, subsequent encounter  3. Acute postoperative pain of right knee  4. Status post arthroscopy of knee      SUBJECTIVE  Subjective functional status/changes:   [] No changes reported    Patient reports he has been trying to push ROM stretching at home. OBJECTIVE/TREATMENT    ROM 0-90 with manual overpressure    Manual Therapy x 15 mins:   PF/TF mobilizations to affected limb to promote improved joint mobility. PROM for knee flexion and extension mobility. STM/MFR to the distal IT band, quadriceps, peripatellar structures and popliteal musculature. IA peripatellar and incisional mobilizations. Neuromuscular Re-education (NMR) x 15 minutes:  []  Kinesiotaping applied in a peripatellar \"Y\" pattern to promote lymphatic drainage. []  Neuromuscular reeducation to the VMO with use of [place] electrical stimulation in conjunction with active contraction and exercises. [x]  balance/proprioceptive exercises and activities in clinic as listed below as NMR. [x]  Verbal/tactile cueing for isometric quad contraction    Therapeutic Exercise x 30 mins:   Strengthening/Endurance/ADL function/Neuromuscular reeducation activities/exercises supervised and completed as listed below. Passive quad and hamstring flexibility exercises in supine and with leg off table. EXERCISE X= completed on  2022   bike Man.  10' (1)   slantboard x   LAQ/SAQ 3/5   TKE green   Lat steps w/foam --   HS curls --   Cups *NMR x   Balance board *NMR x   TG minisquat B/R uni Level 18/   Tandem stance (nmr) --   Bridges w/band grn Added/Changed Exercises:  [x]  Advanced to address: [x] functional strength/ROM deficits [x] balance/proprioceptive tasks  []  Modified: [] per subjective reports [] for patient time constraints [] for clinic time constraints    Modality:  []  E-Stim: type _ x _ min     []att   []unatt   []w/ice   []w/heat  []  Ultrasound: []cont   []pulse    _ W/cm2 x _  min   []1MHz   []3MHz  [x]  Ice pack: post      []  Hot pack: pre    []  Other:     Patient Education: [] Review HEP    [] Progressed/Changed HEP to include:  [] positioning   [] body mechanics   [] transfers   [] heat/ice application      ASSESSMENT    He has difficulty maintaining solid isometric quad contraction without visual/neuro feedback. Knee flexion gains continue to be slow in nature.      PLAN  [x]  Upgrade activities as tolerated      [x]  Continue plan of care  []  Discharge due to:  [] Other:       Palmira Moritz, PTA 7/25/2022

## 2022-07-28 ENCOUNTER — OFFICE VISIT (OUTPATIENT)
Dept: ORTHOPEDIC SURGERY | Age: 52
End: 2022-07-28
Payer: COMMERCIAL

## 2022-07-28 DIAGNOSIS — G89.18 ACUTE POSTOPERATIVE PAIN OF RIGHT KNEE: ICD-10-CM

## 2022-07-28 DIAGNOSIS — S83.511D COMPLETE TEAR OF RIGHT ACL, SUBSEQUENT ENCOUNTER: Primary | ICD-10-CM

## 2022-07-28 DIAGNOSIS — M25.561 ACUTE POSTOPERATIVE PAIN OF RIGHT KNEE: ICD-10-CM

## 2022-07-28 DIAGNOSIS — S83.411D COMPLETE TEAR OF MCL OF KNEE, RIGHT, SUBSEQUENT ENCOUNTER: ICD-10-CM

## 2022-07-28 PROCEDURE — 97140 MANUAL THERAPY 1/> REGIONS: CPT | Performed by: PHYSICAL THERAPIST

## 2022-07-28 PROCEDURE — 97110 THERAPEUTIC EXERCISES: CPT | Performed by: PHYSICAL THERAPIST

## 2022-07-28 PROCEDURE — 97112 NEUROMUSCULAR REEDUCATION: CPT | Performed by: PHYSICAL THERAPIST

## 2022-07-28 NOTE — PROGRESS NOTES
PT DAILY TREATMENT NOTE    Patient Name: Leona Joel  Date:2022  : 1970  [x]  Patient  Verified  Payor: Marcie Fees / Plan: [address] / Product Type: PPO /    Total Treatment Time (min): 60+  1:1 Treatment Time ( [address] only):    Referring Provider: Lanetta Halsted, DO    1. Complete tear of right ACL, subsequent encounter  2. Complete tear of MCL of knee, right, subsequent encounter  3. Acute postoperative pain of right knee      SUBJECTIVE  Subjective functional status/changes:   [] No changes reported  Patient states he did note some drainage along the medial incision line since last visit. States he is aware that progress is gradual but feels his gait is improving and satisfied with his ROM improvements. Still feels very stiff in the AM.       OBJECTIVE/TREATMENT    ROM 0-90 with manual overpressure    Manual Therapy x 15 mins:   PF/TF mobilizations to affected limb to promote improved joint mobility. PROM for knee flexion and extension mobility. STM/MFR to the distal IT band, quadriceps, peripatellar structures and popliteal musculature. Neuromuscular Re-education (NMR) x 15 minutes:  []  Kinesiotaping applied in a peripatellar \"Y\" pattern to promote lymphatic drainage. []  Neuromuscular reeducation to the VMO with use of [place] electrical stimulation in conjunction with active contraction and exercises. [x]  balance/proprioceptive exercises and activities in clinic as listed below as NMR. [x]  Verbal/tactile cueing for isometric quad contraction    Therapeutic Exercise x 30 mins:   Strengthening/Endurance/ADL function/Neuromuscular reeducation activities/exercises supervised and completed as listed below. Passive quad and hamstring flexibility exercises in supine and with leg off table. Verbal cueing given for correct exercise performance. EXERCISE X= completed on  2022   bike Man.  10' (1)   slantboard x   LAQ/SAQ 3/6   TKE green   Lat steps w/foam --   HS curls --   Cups *NMR x   Balance board *NMR x   TG minisquat B/R uni Level 18/11   Tandem stance (nmr) --   Bridges w/band grn   Box step up/down #2   AP Gait green                               Added/Changed Exercises:  [x]  Advanced to address: [x] functional strength/ROM deficits [x] balance/proprioceptive tasks  []  Modified: [] per subjective reports [] for patient time constraints [] for clinic time constraints    Modality:  []  E-Stim: type _ x _ min     []att   []unatt   []w/ice   []w/heat  []  Ultrasound: []cont   []pulse    _ W/cm2 x _  min   []1MHz   []3MHz  [x]  Ice pack: post      []  Hot pack: pre    []  Other:     Patient Education: [] Review HEP    [] Progressed/Changed HEP to include:  [] positioning   [] body mechanics   [] transfers   [] heat/ice application      ASSESSMENT  Still with considerable quad weakness and delay in activation. Needs some cueing to focus on technique with exercise rather than compensating to achieve completion. Needs to continue to push his ROM.      PLAN  [x]  Upgrade activities as tolerated      [x]  Continue plan of care  []  Discharge due to:  [] Other:       Deloras Baumgarten, PT, DPT 7/28/2022

## 2022-08-01 ENCOUNTER — OFFICE VISIT (OUTPATIENT)
Dept: ORTHOPEDIC SURGERY | Age: 52
End: 2022-08-01
Payer: COMMERCIAL

## 2022-08-01 DIAGNOSIS — S83.411D COMPLETE TEAR OF MCL OF KNEE, RIGHT, SUBSEQUENT ENCOUNTER: ICD-10-CM

## 2022-08-01 DIAGNOSIS — Z98.890 STATUS POST ARTHROSCOPY OF KNEE: ICD-10-CM

## 2022-08-01 DIAGNOSIS — G89.18 ACUTE POSTOPERATIVE PAIN OF RIGHT KNEE: ICD-10-CM

## 2022-08-01 DIAGNOSIS — M25.561 ACUTE POSTOPERATIVE PAIN OF RIGHT KNEE: ICD-10-CM

## 2022-08-01 DIAGNOSIS — S83.511D COMPLETE TEAR OF RIGHT ACL, SUBSEQUENT ENCOUNTER: Primary | ICD-10-CM

## 2022-08-01 PROCEDURE — 97112 NEUROMUSCULAR REEDUCATION: CPT

## 2022-08-01 PROCEDURE — 97110 THERAPEUTIC EXERCISES: CPT

## 2022-08-01 PROCEDURE — 97140 MANUAL THERAPY 1/> REGIONS: CPT

## 2022-08-01 NOTE — PROGRESS NOTES
I have reviewed the notes, assessments, and/or procedures performed by Magui Fuss PTA, I concur with her/his documentation of Nargis Hancock.

## 2022-08-01 NOTE — PROGRESS NOTES
I have reviewed the notes, assessments, and/or procedures performed by Adelaide Soto PTA, I concur with her/his documentation of Mi Steele.

## 2022-08-01 NOTE — PROGRESS NOTES
PT DAILY TREATMENT NOTE    Patient Name: Fatou Mata  Date:2022  : 1970  [x]  Patient  Verified  Payor: Tashi Franklin / Plan:  [facility] / Product Type: PPO /    Total Treatment Time (min): 60+  1:1 Treatment Time ( [address] only):    Referring Provider: Venu Bellamy DO    1. Complete tear of right ACL, subsequent encounter  2. Complete tear of MCL of knee, right, subsequent encounter  3. Acute postoperative pain of right knee  4. Status post arthroscopy of knee      SUBJECTIVE  Subjective functional status/changes:   [] No changes reported    Patient reports his knee feels better from day to day even though it is still not as flexible as he would like. OBJECTIVE/TREATMENT    ROM 0-90 with manual overpressure    Manual Therapy x 15 mins:   PF/TF mobilizations to affected limb to promote improved joint mobility. PROM for knee flexion and extension mobility. STM/MFR to the distal IT band, quadriceps, peripatellar structures and popliteal musculature. Neuromuscular Re-education (NMR) x 15 minutes:  []  Kinesiotaping applied in a peripatellar \"Y\" pattern to promote lymphatic drainage. []  Neuromuscular reeducation to the VMO with use of [place] electrical stimulation in conjunction with active contraction and exercises. [x]  balance/proprioceptive exercises and activities in clinic as listed below as NMR. [x]  Verbal/tactile cueing for isometric quad contraction    Therapeutic Exercise x 30 mins:   Strengthening/Endurance/ADL function/Neuromuscular reeducation activities/exercises supervised and completed as listed below. Passive quad and hamstring flexibility exercises in supine and with leg off table. Verbal cueing given for correct exercise performance. EXERCISE X= completed on  2022   bike Man.  10' (1)   slantboard x   LAQ/SAQ 3/6   TKE green   Lat steps w/foam --   HS curls --   Cups *NMR x   Balance board *NMR x   TG minisquat B/R uni Level 18/   Tandem stance (nmr) --   Bridges w/band grn   Box step up/down #2   AP Gait green                               Added/Changed Exercises:  []  Advanced to address: [] functional strength/ROM deficits [] balance/proprioceptive tasks  []  Modified: [] per subjective reports [] for patient time constraints [] for clinic time constraints    Modality:  []  E-Stim: type _ x _ min     []att   []unatt   []w/ice   []w/heat  []  Ultrasound: []cont   []pulse    _ W/cm2 x _  min   []1MHz   []3MHz  [x]  Ice pack: post      []  Hot pack: pre    []  Other:     Patient Education: [] Review HEP    [] Progressed/Changed HEP to include:  [] positioning   [] body mechanics   [] transfers   [] heat/ice application      ASSESSMENT    He continues to have difficulty isolating the quads isometrically without visual cuing. Still with clear quad weakness for step up/down tasks. Continues to be restricted with knee flexion ROM but soliz shave better mobility through available ROM.      PLAN  [x]  Upgrade activities as tolerated      [x]  Continue plan of care  []  Discharge due to:  [] Other:       Martin Hernandez, PTA 8/1/2022

## 2022-08-04 ENCOUNTER — OFFICE VISIT (OUTPATIENT)
Dept: ORTHOPEDIC SURGERY | Age: 52
End: 2022-08-04
Payer: COMMERCIAL

## 2022-08-04 DIAGNOSIS — S83.511D COMPLETE TEAR OF RIGHT ACL, SUBSEQUENT ENCOUNTER: Primary | ICD-10-CM

## 2022-08-04 DIAGNOSIS — S83.411D COMPLETE TEAR OF MCL OF KNEE, RIGHT, SUBSEQUENT ENCOUNTER: ICD-10-CM

## 2022-08-04 DIAGNOSIS — G89.18 ACUTE POSTOPERATIVE PAIN OF RIGHT KNEE: ICD-10-CM

## 2022-08-04 DIAGNOSIS — M25.561 ACUTE POSTOPERATIVE PAIN OF RIGHT KNEE: ICD-10-CM

## 2022-08-04 PROCEDURE — 97112 NEUROMUSCULAR REEDUCATION: CPT | Performed by: PHYSICAL THERAPIST

## 2022-08-04 PROCEDURE — 97140 MANUAL THERAPY 1/> REGIONS: CPT | Performed by: PHYSICAL THERAPIST

## 2022-08-04 PROCEDURE — 97110 THERAPEUTIC EXERCISES: CPT | Performed by: PHYSICAL THERAPIST

## 2022-08-05 NOTE — PROGRESS NOTES
PT DAILY TREATMENT NOTE    Patient Name: Keyla Lu  Date:2022  : 1970  [x]  Patient  Verified  Payor: Donovan Kidd / Plan:  [facility] [place] [place] / Product Type: PPO /    Total Treatment Time (min): 60+  1:1 Treatment Time ( [address] only):    Referring Provider: Ernst Ennis DO    1. Complete tear of right ACL, subsequent encounter  2. Complete tear of MCL of knee, right, subsequent encounter  3. Acute postoperative pain of right knee      SUBJECTIVE  Subjective functional status/changes:   [] No changes reported    No specific changes/complaints. Still with stiffness but patient pleased with progress. OBJECTIVE/TREATMENT    Manual Therapy x 15 mins:   PF/TF mobilizations to affected limb to promote improved joint mobility. PROM for knee flexion and extension mobility. STM/MFR to the distal IT band, quadriceps, peripatellar structures and popliteal musculature. Neuromuscular Re-education (NMR) x 15 minutes:  []  Kinesiotaping applied in a peripatellar \"Y\" pattern to promote lymphatic drainage. []  Neuromuscular reeducation to the VMO with use of [place] electrical stimulation in conjunction with active contraction and exercises. [x]  balance/proprioceptive exercises and activities in clinic as listed below as NMR. [x]  Verbal/tactile cueing for isometric quad contraction    Therapeutic Exercise x 30 mins:   Strengthening/Endurance/ADL function/Neuromuscular reeducation activities/exercises supervised and completed as listed below. Passive quad and hamstring flexibility exercises in supine and with leg off table. Verbal cueing given for correct exercise performance. EXERCISE X= completed on  2022   [place] Man.  10' (1)   slantboard x   LAQ/SAQ 3/6   TKE green   Lat steps w/foam --   HS curls --   Cups *NMR x   Balance board *NMR x   TG minisquat B/R uni Level 18/11   Tandem stance (nmr) --   Bridges w/band grn   Box step up/down #2   AP Gait green   Sit-stand y   ABC with ball y ROM 0-92 at end of treatment. Added/Changed Exercises:  []  Advanced to address: [] functional strength/ROM deficits [] balance/proprioceptive tasks  []  Modified: [] per subjective reports [] for patient time constraints [] for clinic time constraints    Modality:  []  E-Stim: type _ x _ min     []att   []unatt   []w/ice   []w/heat  []  Ultrasound: []cont   []pulse    _ W/cm2 x _  min   []1MHz   []3MHz  []  Ice pack: post      []  Hot pack: pre    []  Other:     Patient Education: [] Review HEP    [] Progressed/Changed HEP to include:  [] positioning   [] body mechanics   [] transfers   [] heat/ice application      ASSESSMENT    Better able to compensate for a more normalized gait but still with clear isometric quad weakness and stiffness with ROM when looked at in isolation on the exam table.      PLAN  [x]  Upgrade activities as tolerated      [x]  Continue plan of care  []  Discharge due to:  [] Other:       Danitza Hanson PT, DPT 8/4/2022

## 2022-08-08 ENCOUNTER — OFFICE VISIT (OUTPATIENT)
Dept: ORTHOPEDIC SURGERY | Age: 52
End: 2022-08-08
Payer: COMMERCIAL

## 2022-08-08 DIAGNOSIS — S83.411D COMPLETE TEAR OF MCL OF KNEE, RIGHT, SUBSEQUENT ENCOUNTER: ICD-10-CM

## 2022-08-08 DIAGNOSIS — S83.511D COMPLETE TEAR OF RIGHT ACL, SUBSEQUENT ENCOUNTER: Primary | ICD-10-CM

## 2022-08-08 DIAGNOSIS — G89.18 ACUTE POSTOPERATIVE PAIN OF RIGHT KNEE: ICD-10-CM

## 2022-08-08 DIAGNOSIS — Z98.890 STATUS POST ARTHROSCOPY OF KNEE: ICD-10-CM

## 2022-08-08 DIAGNOSIS — M25.561 ACUTE POSTOPERATIVE PAIN OF RIGHT KNEE: ICD-10-CM

## 2022-08-08 PROCEDURE — 97110 THERAPEUTIC EXERCISES: CPT

## 2022-08-08 PROCEDURE — 97112 NEUROMUSCULAR REEDUCATION: CPT

## 2022-08-08 PROCEDURE — 97140 MANUAL THERAPY 1/> REGIONS: CPT

## 2022-08-08 NOTE — PROGRESS NOTES
I have reviewed the notes, assessments, and/or procedures performed by Rolo Man PTA, I concur with her/his documentation of Dulce Rosales.

## 2022-08-15 ENCOUNTER — OFFICE VISIT (OUTPATIENT)
Dept: ORTHOPEDIC SURGERY | Age: 52
End: 2022-08-15
Payer: COMMERCIAL

## 2022-08-15 DIAGNOSIS — G89.18 ACUTE POSTOPERATIVE PAIN OF RIGHT KNEE: ICD-10-CM

## 2022-08-15 DIAGNOSIS — S83.411D COMPLETE TEAR OF MCL OF KNEE, RIGHT, SUBSEQUENT ENCOUNTER: ICD-10-CM

## 2022-08-15 DIAGNOSIS — S83.511D COMPLETE TEAR OF RIGHT ACL, SUBSEQUENT ENCOUNTER: Primary | ICD-10-CM

## 2022-08-15 DIAGNOSIS — M25.561 ACUTE POSTOPERATIVE PAIN OF RIGHT KNEE: ICD-10-CM

## 2022-08-15 DIAGNOSIS — Z98.890 STATUS POST ARTHROSCOPY OF KNEE: ICD-10-CM

## 2022-08-15 PROCEDURE — 97140 MANUAL THERAPY 1/> REGIONS: CPT

## 2022-08-15 PROCEDURE — 97112 NEUROMUSCULAR REEDUCATION: CPT

## 2022-08-15 PROCEDURE — 97110 THERAPEUTIC EXERCISES: CPT

## 2022-08-15 NOTE — PROGRESS NOTES
PT DAILY TREATMENT NOTE    Patient Name: Nargis Hancock  Date:2022  : 1970  [x]  Patient  Verified  Payor: Henny Acevedo / Plan:  [facility] / Product Type: PPO /    Total Treatment Time (min): 60+  1:1 Treatment Time ([facility] only):    Referring Provider: Monica Ledezma DO    1. Complete tear of right ACL, subsequent encounter  2. Complete tear of MCL of knee, right, subsequent encounter  3. Acute postoperative pain of right knee  4. Status post arthroscopy of knee      SUBJECTIVE  Subjective functional status/changes:   [] No changes reported    Patient reports his knee is still sore but he feels it is moving a little better. OBJECTIVE/TREATMENT    Manual Therapy x 15 mins:   PF/TF mobilizations to affected limb to promote improved joint mobility. PROM for knee flexion and extension mobility. STM/MFR to the distal IT band, quadriceps, peripatellar structures and popliteal musculature. Neuromuscular Re-education (NMR) x 15 minutes:  []  Kinesiotaping applied in a peripatellar \"Y\" pattern to promote lymphatic drainage. []  Neuromuscular reeducation to the VMO with use of [place] electrical stimulation in conjunction with active contraction and exercises. [x]  balance/proprioceptive exercises and activities in clinic as listed below as NMR. [x]  Verbal/tactile cueing for isometric quad contraction    Therapeutic Exercise x 30 mins:   Strengthening/Endurance/ADL function/Neuromuscular reeducation activities/exercises supervised and completed as listed below. Passive quad and hamstring flexibility exercises in supine and with leg off table. Verbal cueing given for correct exercise performance. EXERCISE X= completed on  8/15/2022   bike Man.  10' (1)   slantboard x   LAQ/SAQ 3/6   TKE green   Lat steps w/foam --   HS curls --   Cups *NMR x   Balance board *NMR x   TG minisquat B/R uni Level 18/11   Tandem stance (nmr) --   Bridges w/band grn   Box step up/down #2   AP Gait green Sit-stand x   ABC with ball s   /70   Ecc. phonebooks x             ROM 0-95 at end of treatment. Added/Changed Exercises:  [x]  Advanced to address: [x] functional strength/ROM deficits [x] balance/proprioceptive tasks  []  Modified: [] per subjective reports [] for patient time constraints [] for clinic time constraints    Modality:  []  E-Stim: type _ x _ min     []att   []unatt   []w/ice   []w/heat  []  Ultrasound: []cont   []pulse    _ W/cm2 x _  min   []1MHz   []3MHz  []  Ice pack: post      []  Hot pack: pre    []  Other:     Patient Education: [] Review HEP    [] Progressed/Changed HEP to include:  [] positioning   [] body mechanics   [] transfers   [] heat/ice application      ASSESSMENT    We are pushing knee flexion ROM and quad strengthening to maximize functional gains, but he is still very stiff with eccentric weakness.      PLAN  [x]  Upgrade activities as tolerated      [x]  Continue plan of care  []  Discharge due to:  [] Other:       Beth Grewal, PTA 8/15/2022

## 2022-08-16 NOTE — PROGRESS NOTES
I reviewed the notes, assessments, and/or procedures performed by Keenan Tomlinson PTA, I concur with her documentation of Geraldine Mcduffie

## 2022-08-18 ENCOUNTER — OFFICE VISIT (OUTPATIENT)
Dept: ORTHOPEDIC SURGERY | Age: 52
End: 2022-08-18
Payer: COMMERCIAL

## 2022-08-18 DIAGNOSIS — S83.511D COMPLETE TEAR OF RIGHT ACL, SUBSEQUENT ENCOUNTER: Primary | ICD-10-CM

## 2022-08-18 DIAGNOSIS — S83.411D COMPLETE TEAR OF MCL OF KNEE, RIGHT, SUBSEQUENT ENCOUNTER: ICD-10-CM

## 2022-08-18 DIAGNOSIS — G89.18 ACUTE POSTOPERATIVE PAIN OF RIGHT KNEE: ICD-10-CM

## 2022-08-18 DIAGNOSIS — M25.561 ACUTE POSTOPERATIVE PAIN OF RIGHT KNEE: ICD-10-CM

## 2022-08-18 DIAGNOSIS — Z98.890 STATUS POST ARTHROSCOPY OF KNEE: ICD-10-CM

## 2022-08-18 PROCEDURE — 97112 NEUROMUSCULAR REEDUCATION: CPT | Performed by: PHYSICAL THERAPIST

## 2022-08-18 PROCEDURE — 97110 THERAPEUTIC EXERCISES: CPT | Performed by: PHYSICAL THERAPIST

## 2022-08-18 PROCEDURE — 97140 MANUAL THERAPY 1/> REGIONS: CPT | Performed by: PHYSICAL THERAPIST

## 2022-08-18 NOTE — PROGRESS NOTES
PT DAILY TREATMENT NOTE    Patient Name: Leona Joel  Date:2022  : 1970  [x]  Patient  Verified  Payor: Marcie Fees / Plan: [facility] / Product Type: PPO /    Total Treatment Time (min): 60+  1:1 Treatment Time ( [address] only):    Referring Provider: Lanetta Halsted, DO    1. Complete tear of right ACL, subsequent encounter  2. Acute postoperative pain of right knee  3. Complete tear of MCL of knee, right, subsequent encounter  4. Status post arthroscopy of knee      SUBJECTIVE  Subjective functional status/changes:   [] No changes reported    Patient reports his knee is still sore but he feels it is moving a little better. OBJECTIVE/TREATMENT    Manual Therapy x 15 mins:   PF/TF mobilizations to affected limb to promote improved joint mobility. PROM for knee flexion and extension mobility. STM/MFR to the distal IT band, quadriceps, peripatellar structures and popliteal musculature. Neuromuscular Re-education (NMR) x 15 minutes:  []  Kinesiotaping applied in a peripatellar \"Y\" pattern to promote lymphatic drainage. []  Neuromuscular reeducation to the VMO with use of [place] electrical stimulation in conjunction with active contraction and exercises. [x]  balance/proprioceptive exercises and activities in clinic as listed below as NMR. [x]  Verbal/tactile cueing for isometric quad contraction    Therapeutic Exercise x 30 mins:   Strengthening/Endurance/ADL function/Neuromuscular reeducation activities/exercises supervised and completed as listed below. Passive quad and hamstring flexibility exercises in supine and with leg off table. Verbal cueing given for correct exercise performance. EXERCISE X= completed on  2022   bike Man.  10' (1)   slantboard x   LAQ/SAQ 3/6   TKE green   Lat steps w/foam --   HS curls --   Cups *NMR x   Balance board *NMR x   TG minisquat B/R uni Level    Tandem stance (nmr) --   Bridges w/band grn   Box step up/down #2   AP Gait green Sit-stand x   ABC with ball s   /70   Ecc. phonebooks x             ROM 0-95 at end of treatment. Added/Changed Exercises:  [x]  Advanced to address: [x] functional strength/ROM deficits [x] balance/proprioceptive tasks  []  Modified: [] per subjective reports [] for patient time constraints [] for clinic time constraints    Modality:  []  E-Stim: type _ x _ min     []att   []unatt   []w/ice   []w/heat  []  Ultrasound: []cont   []pulse    _ W/cm2 x _  min   []1MHz   []3MHz  []  Ice pack: post      []  Hot pack: pre    []  Other:     Patient Education: [] Review HEP    [] Progressed/Changed HEP to include:  [] positioning   [] body mechanics   [] transfers   [] heat/ice application      ASSESSMENT    We are pushing knee flexion ROM and quad strengthening to maximize functional gains, but he is still very stiff with eccentric weakness.      PLAN  [x]  Upgrade activities as tolerated      [x]  Continue plan of care  []  Discharge due to:  [] Other:       Tianna Thompson, PTA 8/18/2022

## 2022-08-19 NOTE — PROGRESS NOTES
PT DAILY TREATMENT NOTE    Patient Name: Dulce Rosales  Date:2022  : 1970  [x]  Patient  Verified  Payor: Devin Borden / Plan:  [facility] / Product Type: PPO /    Total Treatment Time (min): 60+  1:1 Treatment Time ( [address] only):    Referring Provider: Kristi Duval,     1. Complete tear of right ACL, subsequent encounter  2. Acute postoperative pain of right knee  3. Complete tear of MCL of knee, right, subsequent encounter  4. Status post arthroscopy of knee      SUBJECTIVE  Subjective functional status/changes:   [] No changes reported    Patient states that he was able to use a stationary bike yesterday. OBJECTIVE/TREATMENT    Manual Therapy x 15 mins:   PF/TF mobilizations to affected limb to promote improved joint mobility. PROM for knee flexion and extension mobility. STM/MFR to the distal IT band, quadriceps, peripatellar structures and popliteal musculature. Neuromuscular Re-education (NMR) x 15 minutes:  []  Kinesiotaping applied in a peripatellar \"Y\" pattern to promote lymphatic drainage. []  Neuromuscular reeducation to the VMO with use of [place] electrical stimulation in conjunction with active contraction and exercises. [x]  balance/proprioceptive exercises and activities in clinic as listed below as NMR. [x]  Verbal/tactile cueing for isometric quad contraction    Therapeutic Exercise x 30 mins:   Strengthening/Endurance/ADL function/Neuromuscular reeducation activities/exercises supervised and completed as listed below. Passive quad and hamstring flexibility exercises in supine and with leg off table. Verbal cueing given for correct exercise performance. EXERCISE X= completed on  2022   bike Man.  10' (1)   slantboard x   LAQ/SAQ 3/6   TKE green   Lat steps w/foam --   HS curls --   Cups *NMR x   Balance board *NMR x   TG minisquat B/R uni Level /   Tandem stance (nmr) --   Bridges w/band grn   Box step up/down #2   AP Gait green   Sit-stand x ABC with ball s   /70   Ecc. phonebooks x             ROM 0-95 at end of treatment. Added/Changed Exercises:  [x]  Advanced to address: [x] functional strength/ROM deficits [x] balance/proprioceptive tasks  []  Modified: [] per subjective reports [] for patient time constraints [] for clinic time constraints    Modality:  []  E-Stim: type _ x _ min     []att   []unatt   []w/ice   []w/heat  []  Ultrasound: []cont   []pulse    _ W/cm2 x _  min   []1MHz   []3MHz  []  Ice pack: post      []  Hot pack: pre    []  Other:     Patient Education: [] Review HEP    [] Progressed/Changed HEP to include:  [] positioning   [] body mechanics   [] transfers   [] heat/ice application      ASSESSMENT    Significant patellofemoral hypomobility remains limiting knee flexion ROM. Continue plan with focus on restoring full ROM to achieve long-term goals.     PLAN  [x]  Upgrade activities as tolerated      [x]  Continue plan of care  []  Discharge due to:  [] Other:       Robe Meng, PT 8/19/2022

## 2022-08-22 ENCOUNTER — OFFICE VISIT (OUTPATIENT)
Dept: ORTHOPEDIC SURGERY | Age: 52
End: 2022-08-22
Payer: COMMERCIAL

## 2022-08-22 DIAGNOSIS — S83.411D COMPLETE TEAR OF MCL OF KNEE, RIGHT, SUBSEQUENT ENCOUNTER: ICD-10-CM

## 2022-08-22 DIAGNOSIS — Z98.890 STATUS POST ARTHROSCOPY OF KNEE: ICD-10-CM

## 2022-08-22 DIAGNOSIS — S83.511D COMPLETE TEAR OF RIGHT ACL, SUBSEQUENT ENCOUNTER: Primary | ICD-10-CM

## 2022-08-22 DIAGNOSIS — M25.561 ACUTE POSTOPERATIVE PAIN OF RIGHT KNEE: ICD-10-CM

## 2022-08-22 DIAGNOSIS — G89.18 ACUTE POSTOPERATIVE PAIN OF RIGHT KNEE: ICD-10-CM

## 2022-08-22 PROCEDURE — 97140 MANUAL THERAPY 1/> REGIONS: CPT

## 2022-08-22 PROCEDURE — 97110 THERAPEUTIC EXERCISES: CPT

## 2022-08-22 PROCEDURE — 97112 NEUROMUSCULAR REEDUCATION: CPT

## 2022-08-22 NOTE — PROGRESS NOTES
PT DAILY TREATMENT NOTE    Patient Name: Amber Diaz  Date:2022  : 1970  [x]  Patient  Verified  Payor: Rico Lilly / Plan:  [facility] / Product Type: PPO /    Total Treatment Time (min): 60+  1:1 Treatment Time ( [address] only):    Referring Provider: Robert Kwon,     1. Complete tear of right ACL, subsequent encounter  2. Acute postoperative pain of right knee  3. Complete tear of MCL of knee, right, subsequent encounter  4. Status post arthroscopy of knee    SUBJECTIVE  Subjective functional status/changes:   [] No changes reported    Patient states he is walking faster and more smoothly and he feels more confident going down steps. OBJECTIVE/TREATMENT    Manual Therapy x 15 mins:   PF/TF mobilizations to affected limb to promote improved joint mobility. PROM for knee flexion and extension mobility. STM/MFR to the distal IT band, quadriceps, peripatellar structures and popliteal musculature. Neuromuscular Re-education (NMR) x 15 minutes:  []  Kinesiotaping applied in a peripatellar \"Y\" pattern to promote lymphatic drainage. []  Neuromuscular reeducation to the VMO with use of [place] electrical stimulation in conjunction with active contraction and exercises. [x]  balance/proprioceptive exercises and activities in clinic as listed below as NMR. [x]  Verbal/tactile cueing for isometric quad contraction    Therapeutic Exercise x 30 mins:   Strengthening/Endurance/ADL function/Neuromuscular reeducation activities/exercises supervised and completed as listed below. Passive quad and hamstring flexibility exercises in supine and with leg off table. Verbal cueing given for correct exercise performance. EXERCISE X= completed on  2022   bike Man.  10' (1)   slantboard x   LAQ/SAQ 3/6   TKE green   Lat steps w/foam --   HS curls --   Cups *NMR x   Balance board *NMR x   TG minisquat B/R uni Level 18/11   Tandem stance (nmr) --   Bridges w/band grn   Box step up/down #2 AP Gait green   Sit-stand x   ABC with ball s   /70   Ecc. phonebooks x             ROM 0-95 at end of treatment. Added/Changed Exercises:  [x]  Advanced to address: [x] functional strength/ROM deficits [x] balance/proprioceptive tasks  []  Modified: [] per subjective reports [] for patient time constraints [] for clinic time constraints    Modality:  []  E-Stim: type _ x _ min     []att   []unatt   []w/ice   []w/heat  []  Ultrasound: []cont   []pulse    _ W/cm2 x _  min   []1MHz   []3MHz  []  Ice pack: post      []  Hot pack: pre    []  Other:     Patient Education: [] Review HEP    [] Progressed/Changed HEP to include:  [] positioning   [] body mechanics   [] transfers   [] heat/ice application      ASSESSMENT    He has better mobility through available ROM but is still very limited into knee flexion. Eccentric quad strength improving.      PLAN  [x]  Upgrade activities as tolerated      [x]  Continue plan of care  []  Discharge due to:  [] Other:       Rolo Man, PTA 8/22/2022

## 2022-08-23 NOTE — PROGRESS NOTES
I have reviewed the notes, assessments, and/or procedures performed by Dhaval Hung PTA, I concur with her/his documentation of Omkar Pitts.

## 2022-08-25 ENCOUNTER — OFFICE VISIT (OUTPATIENT)
Dept: ORTHOPEDIC SURGERY | Age: 52
End: 2022-08-25
Payer: COMMERCIAL

## 2022-08-25 DIAGNOSIS — M25.561 ACUTE POSTOPERATIVE PAIN OF RIGHT KNEE: ICD-10-CM

## 2022-08-25 DIAGNOSIS — S83.411D COMPLETE TEAR OF MCL OF KNEE, RIGHT, SUBSEQUENT ENCOUNTER: ICD-10-CM

## 2022-08-25 DIAGNOSIS — S83.511D COMPLETE TEAR OF RIGHT ACL, SUBSEQUENT ENCOUNTER: Primary | ICD-10-CM

## 2022-08-25 DIAGNOSIS — G89.18 ACUTE POSTOPERATIVE PAIN OF RIGHT KNEE: ICD-10-CM

## 2022-08-25 DIAGNOSIS — Z98.890 STATUS POST ARTHROSCOPY OF KNEE: ICD-10-CM

## 2022-08-25 PROCEDURE — 97140 MANUAL THERAPY 1/> REGIONS: CPT

## 2022-08-25 PROCEDURE — 97112 NEUROMUSCULAR REEDUCATION: CPT

## 2022-08-25 PROCEDURE — 97110 THERAPEUTIC EXERCISES: CPT

## 2022-08-25 NOTE — PROGRESS NOTES
I have reviewed the notes, assessments, and/or procedures performed by Ino Fernando PTA, I concur with her/his documentation of Mary Kate Mello.

## 2022-08-25 NOTE — PROGRESS NOTES
PT DAILY TREATMENT NOTE    Patient Name: Amber Diaz  Date:2022  : 1970  [x]  Patient  Verified  Payor: BLUE CROSS / Plan:  [facility] / Product Type: PPO /    Total Treatment Time (min): 60+    Referring Provider: Robert Kwon DO    1. Complete tear of right ACL, subsequent encounter  2. Acute postoperative pain of right knee  3. Complete tear of MCL of knee, right, subsequent encounter  4. Status post arthroscopy of knee    SUBJECTIVE  Subjective functional status/changes:   [] No changes reported    Patient reports he is frustrated with his lack of ROM and would like to take a break from therapy until he sees MD at beginning of September. OBJECTIVE/TREATMENT    Manual Therapy x 15 mins:   PF/TF mobilizations to affected limb to promote improved joint mobility. PROM for knee flexion and extension mobility. STM/MFR to the distal IT band, quadriceps, peripatellar structures and popliteal musculature. Neuromuscular Re-education (NMR) x 15 minutes:  []  Kinesiotaping applied in a peripatellar \"Y\" pattern to promote lymphatic drainage. []  Neuromuscular reeducation to the VMO with use of [place] electrical stimulation in conjunction with active contraction and exercises. [x]  balance/proprioceptive exercises and activities in clinic as listed below as NMR. [x]  Verbal/tactile cueing for isometric quad contraction    Therapeutic Exercise x 30 mins:   Strengthening/Endurance/ADL function/Neuromuscular reeducation activities/exercises supervised and completed as listed below. Passive quad and hamstring flexibility exercises in supine and with leg off table. Verbal cueing given for correct exercise performance. EXERCISE X= completed on  2022   bike Man.  10' (1)   slantboard x   LAQ/SAQ 3/6   TKE green   Lat steps w/foam --   HS curls --   Cups *NMR x   Balance board *NMR x   TG minisquat B/R uni Level /   Tandem stance (nmr) --   Bridges w/band grn   Box step up/down #2   AP Gait green   Sit-stand x   ABC with ball s   /70   Ecc. phonebooks x             ROM 0-95 at end of treatment. Added/Changed Exercises:  [x]  Advanced to address: [x] functional strength/ROM deficits [x] balance/proprioceptive tasks  []  Modified: [] per subjective reports [] for patient time constraints [] for clinic time constraints    Modality:  []  E-Stim: type _ x _ min     []att   []unatt   []w/ice   []w/heat  []  Ultrasound: []cont   []pulse    _ W/cm2 x _  min   []1MHz   []3MHz  []  Ice pack: post      []  Hot pack: pre    []  Other:     Patient Education: [] Review HEP    [] Progressed/Changed HEP to include:  [] positioning   [] body mechanics   [] transfers   [] heat/ice application      ASSESSMENT    Quad strength improving but ROM into knee flexion has plateaued with all attempts here in the clinic. He is planning to see MD in early September and will follow-up with us at that point.      PLAN  [x]  Upgrade activities as tolerated      [x]  Continue plan of care  []  Discharge due to:  [] Other:       Julia Claudio, PTA 8/25/2022

## 2022-09-07 ENCOUNTER — OFFICE VISIT (OUTPATIENT)
Dept: ORTHOPEDIC SURGERY | Age: 52
End: 2022-09-07
Payer: COMMERCIAL

## 2022-09-07 VITALS — HEIGHT: 71 IN | WEIGHT: 210 LBS | BODY MASS INDEX: 29.4 KG/M2

## 2022-09-07 DIAGNOSIS — M24.661 ARTHROFIBROSIS OF KNEE JOINT, RIGHT: ICD-10-CM

## 2022-09-07 DIAGNOSIS — Z98.890 STATUS POST ARTHROSCOPY OF KNEE: Primary | ICD-10-CM

## 2022-09-07 PROCEDURE — 99212 OFFICE O/P EST SF 10 MIN: CPT | Performed by: ORTHOPAEDIC SURGERY

## 2022-09-07 RX ORDER — CYCLOBENZAPRINE HCL 10 MG
10 TABLET ORAL
Qty: 30 TABLET | Refills: 0 | Status: SHIPPED | OUTPATIENT
Start: 2022-09-07 | End: 2022-10-07

## 2022-09-07 RX ORDER — DICLOFENAC SODIUM 75 MG/1
75 TABLET, DELAYED RELEASE ORAL 2 TIMES DAILY
Qty: 60 TABLET | Refills: 3 | Status: SHIPPED | OUTPATIENT
Start: 2022-09-07

## 2022-09-07 RX ORDER — METHYLPREDNISOLONE 4 MG/1
TABLET ORAL
Qty: 1 DOSE PACK | Refills: 0 | Status: SHIPPED | OUTPATIENT
Start: 2022-09-07

## 2022-09-07 NOTE — PROGRESS NOTES
Geraldine Mcduffie (: 1970) is a 45 y.o. male, established patient, here for evaluation of the following chief complaint(s):  Post OP Follow Up (knee)       ASSESSMENT/PLAN:  Below is the assessment and plan developed based on review of pertinent history, physical exam, labs, studies, and medications. If he has no improvement and range of motion over the next couple of weeks despite Medrol Dosepak, Flexeril, and Voltaren then we will plan for manipulation under anesthesia with corticosteroid injection. He will continue with his physical therapy regimen and home exercise program.  He will call if he has continued stiffness in the knee. Otherwise I will see him back in 6 weeks    1. Status post arthroscopy of knee  -     methylPREDNISolone (Medrol, Noam,) 4 mg tablet; Use as directed, Normal, Disp-1 Dose Pack, R-0  -     cyclobenzaprine (FLEXERIL) 10 mg tablet; Take 1 Tablet by mouth nightly as needed for Muscle Spasm(s). , Normal, Disp-30 Tablet, R-0  -     diclofenac EC (VOLTAREN) 75 mg EC tablet; Take 1 Tablet by mouth two (2) times a day., Normal, Disp-60 Tablet, R-3  2. Arthrofibrosis of knee joint, right  -     methylPREDNISolone (Medrol, Noam,) 4 mg tablet; Use as directed, Normal, Disp-1 Dose Pack, R-0  -     cyclobenzaprine (FLEXERIL) 10 mg tablet; Take 1 Tablet by mouth nightly as needed for Muscle Spasm(s). , Normal, Disp-30 Tablet, R-0  -     diclofenac EC (VOLTAREN) 75 mg EC tablet; Take 1 Tablet by mouth two (2) times a day., Normal, Disp-60 Tablet, R-3      Return for call if no improvement to schedule JUANITO. SUBJECTIVE/OBJECTIVE:  Geraldine Mcduffie (: 1970) is a 45 y.o. male. He is now 3 months out from his ACL reconstruction and MCL repair. Overall he notes normal stability in the knee but has had difficulty regaining his flexion. He continues to work with physical therapy and notes that he is stuck at 95 degrees of flexion. He has adequate extension of the knee.         Allergies Allergen Reactions    Sulfa (Sulfonamide Antibiotics) Hives       Current Outpatient Medications   Medication Sig    methylPREDNISolone (Medrol, Noam,) 4 mg tablet Use as directed    cyclobenzaprine (FLEXERIL) 10 mg tablet Take 1 Tablet by mouth nightly as needed for Muscle Spasm(s). diclofenac EC (VOLTAREN) 75 mg EC tablet Take 1 Tablet by mouth two (2) times a day. ibuprofen (MOTRIN) 800 mg tablet Take 1 Tablet by mouth every eight (8) hours as needed for Pain.    naloxone (NARCAN) 4 mg/actuation nasal spray Use 1 spray intranasally, then discard. Repeat with new spray every 2 min as needed for opioid overdose symptoms, alternating nostrils. ondansetron hcl (Zofran) 4 mg tablet Take 1 Tablet by mouth every eight (8) hours as needed for Nausea or Vomiting. acetaminophen (Tylenol Extra Strength) 500 mg tablet Take 1,000 mg by mouth every six (6) hours as needed for Pain. FreeStyle Larissa 2 Sensor kit APPLY 1 APPLICATION EVERY 2 WEEKS    testosterone 12.5 mg/ 1.25 gram (1 %) glpm APPLY 4 PUMPS TOPICALLY TO SKIN EVERY DAY    coenzyme Q-10 (CO Q-10) 30 mg capsule Take 30 mg by mouth.    ezetimibe (Zetia) 10 mg tablet Take 10 mg by mouth daily. aspirin delayed-release 81 mg tablet Take 81 mg by mouth daily. Indications: FOR GOOD HEALTH    methocarbamoL (ROBAXIN) 500 mg tablet Take 500 mg by mouth four (4) times daily. (Patient not taking: Reported on 5/25/2022)    amitriptyline (ELAVIL) 25 mg tablet Take 25 mg by mouth nightly. atorvastatin (LIPITOR) 20 mg tablet Take 20 mg by mouth daily. canagliflozin-metformin 50-1,000 mg tab Take 1 Tablet by mouth two (2) times a day. finasteride (PROSCAR) 5 mg tablet Take 5 mg by mouth two (2) times a day. glimepiride (AMARYL PO) Take  by mouth two (2) times a day. Lantus Solmarquisear U-100 Insulin 100 unit/mL (3 mL) inpn ADMINISTER 30 UNITS UNDER THE SKIN EVERY DAY    diclofenac EC (VOLTAREN) 75 mg EC tablet Take 1 Tablet by mouth two (2) times a day. ibuprofen (MOTRIN) 600 mg tablet Take 1 Tab by mouth every six (6) hours as needed for Pain. No current facility-administered medications for this visit. Social History     Socioeconomic History    Marital status: SINGLE     Spouse name: Not on file    Number of children: Not on file    Years of education: Not on file    Highest education level: Not on file   Occupational History    Not on file   Tobacco Use    Smoking status: Former    Smokeless tobacco: Never   Vaping Use    Vaping Use: Never used   Substance and Sexual Activity    Alcohol use: Yes     Alcohol/week: 7.0 standard drinks     Types: 7 Shots of liquor per week    Drug use: Never    Sexual activity: Not on file   Other Topics Concern    Not on file   Social History Narrative    Not on file     Social Determinants of Health     Financial Resource Strain: Not on file   Food Insecurity: Not on file   Transportation Needs: Not on file   Physical Activity: Not on file   Stress: Not on file   Social Connections: Not on file   Intimate Partner Violence: Not on file   Housing Stability: Not on file       Past Surgical History:   Procedure Laterality Date    HX COLONOSCOPY      HX HERNIA REPAIR      abdominal with mesh    HX WISDOM TEETH EXTRACTION         No family history on file. REVIEW OF SYSTEMS:  ROS    Positive for: Musculoskeletal  Last edited by Yady Workman on 9/7/2022  8:10 AM.        Patient denies any recent fever, chills, nausea, vomiting, chest pain, or shortness of breath. Vitals:  Ht 5' 11\" (1.803 m)   Wt 210 lb (95.3 kg)   BMI 29.29 kg/m²    Body mass index is 29.29 kg/m². PHYSICAL EXAM:  General exam: Patient is awake, alert, and oriented x3. Well-appearing. No acute distress. Ambulates with a normal gait. Right knee: Incisions are well-healed. Stable Lachman's exam valgus stress exam.  He does have active and passive range of motion from 5 to 95 degrees today.   Improving quadricep strength. IMAGING:    XR Results (most recent):  No results found for this or any previous visit. Orders Placed This Encounter    methylPREDNISolone (Medrol, Noam,) 4 mg tablet     Sig: Use as directed     Dispense:  1 Dose Pack     Refill:  0    cyclobenzaprine (FLEXERIL) 10 mg tablet     Sig: Take 1 Tablet by mouth nightly as needed for Muscle Spasm(s). Dispense:  30 Tablet     Refill:  0    diclofenac EC (VOLTAREN) 75 mg EC tablet     Sig: Take 1 Tablet by mouth two (2) times a day. Dispense:  60 Tablet     Refill:  3              An electronic signature was used to authenticate this note.   -- Fabiano Sr, DO

## 2022-09-12 ENCOUNTER — OFFICE VISIT (OUTPATIENT)
Dept: ORTHOPEDIC SURGERY | Age: 52
End: 2022-09-12
Payer: COMMERCIAL

## 2022-09-12 DIAGNOSIS — M24.661 ARTHROFIBROSIS OF KNEE JOINT, RIGHT: Primary | ICD-10-CM

## 2022-09-12 DIAGNOSIS — S83.411D COMPLETE TEAR OF MCL OF KNEE, RIGHT, SUBSEQUENT ENCOUNTER: ICD-10-CM

## 2022-09-12 DIAGNOSIS — S83.511D COMPLETE TEAR OF RIGHT ACL, SUBSEQUENT ENCOUNTER: ICD-10-CM

## 2022-09-12 DIAGNOSIS — M25.561 ACUTE POSTOPERATIVE PAIN OF RIGHT KNEE: ICD-10-CM

## 2022-09-12 DIAGNOSIS — G89.18 ACUTE POSTOPERATIVE PAIN OF RIGHT KNEE: ICD-10-CM

## 2022-09-12 PROCEDURE — 97140 MANUAL THERAPY 1/> REGIONS: CPT | Performed by: PHYSICAL THERAPIST

## 2022-09-12 PROCEDURE — 97110 THERAPEUTIC EXERCISES: CPT | Performed by: PHYSICAL THERAPIST

## 2022-09-12 PROCEDURE — 97112 NEUROMUSCULAR REEDUCATION: CPT | Performed by: PHYSICAL THERAPIST

## 2022-09-12 NOTE — PROGRESS NOTES
PT DAILY TREATMENT NOTE    Patient Name: Keyla Lu  Date:2022  : 1970  [x]  Patient  Verified  Payor: BLUE CROSS / Plan:  [facility] [place] / Product Type: PPO /    Total Treatment Time (min): 60+    Referring Provider: Ernst Ennis DO    1. Arthrofibrosis of knee joint, right  2. Complete tear of right ACL, subsequent encounter  3. Complete tear of MCL of knee, right, subsequent encounter  4. Acute postoperative pain of right knee    SUBJECTIVE  Subjective functional status/changes:   [] No changes reported  Patient returns for first visit since . He has seen the physician in the meantime who would like to hold off on manipulation before trying a round of oral corticosteroids and muscle relaxer to see if this improves any of his symptoms and stiffness. Patient states he just started his Medrol Dosepak today as he has been out of town over the past weekend. OBJECTIVE/TREATMENT    Manual Therapy x 15 mins:   PF/TF mobilizations to affected limb to promote improved joint mobility. PROM for knee flexion and extension mobility. STM/MFR to the distal IT band, quadriceps, peripatellar structures and popliteal musculature. Anterior hip mobilization in prone. Neuromuscular Re-education (NMR) x 15 minutes:  []  Kinesiotaping applied in a peripatellar \"Y\" pattern to promote lymphatic drainage. []  Neuromuscular reeducation to the VMO with use of [place] electrical stimulation in conjunction with active contraction and exercises. [x]  balance/proprioceptive exercises and activities in clinic as listed below as NMR. [x]  Verbal/tactile cueing for isometric quad contraction  Contract relax techniques in prone to assist with passive mobilization. Therapeutic Exercise x 30 mins:   Strengthening/Endurance/ADL function/Neuromuscular reeducation activities/exercises supervised and completed as listed below.  Passive quad and hamstring flexibility exercises in supine/prone, and with leg off table. Verbal cueing given for correct exercise performance. EXERCISE X= completed on  9/12/2022   bike Man. 10' (1)   slantboard x   LAQ/SAQ 3/6   TKE green   Lat steps w/foam --   HS curls --   Cups *NMR x   Balance board *NMR x   TG minisquat B/R uni Level 18/11   Tandem stance (nmr) --   Bridges w/band grn   Box step up/down #2   AP Gait green   Sit-stand x   ABC with ball s   /70   Ecc. phonebooks x             ROM 0-105 actively and able to achieve near 110 with manual overpressure. Added/Changed Exercises:  [x]  Advanced to address: [x] functional strength/ROM deficits [x] balance/proprioceptive tasks  []  Modified: [] per subjective reports [] for patient time constraints [] for clinic time constraints    Modality:  []  E-Stim: type _ x _ min     []att   []unatt   []w/ice   []w/heat  []  Ultrasound: []cont   []pulse    _ W/cm2 x _  min   []1MHz   []3MHz  []  Ice pack: post      []  Hot pack: pre    []  Other:     Patient Education: [] Review HEP    [] Progressed/Changed HEP to include:  [] positioning   [] body mechanics   [] transfers   [] heat/ice application      ASSESSMENT    Still with clear quad weakness but this is certainly his best range of motion to date.   Patient does seem to feel that hip mobilization in prone is beneficial.    PLAN  [x]  Upgrade activities as tolerated      [x]  Continue plan of care  []  Discharge due to:  [] Other:       Odette Jackson, PT, DPT 9/12/2022

## 2022-09-15 ENCOUNTER — OFFICE VISIT (OUTPATIENT)
Dept: ORTHOPEDIC SURGERY | Age: 52
End: 2022-09-15
Payer: COMMERCIAL

## 2022-09-15 DIAGNOSIS — M24.661 ARTHROFIBROSIS OF KNEE JOINT, RIGHT: Primary | ICD-10-CM

## 2022-09-15 DIAGNOSIS — S83.411D COMPLETE TEAR OF MCL OF KNEE, RIGHT, SUBSEQUENT ENCOUNTER: ICD-10-CM

## 2022-09-15 DIAGNOSIS — S83.511D COMPLETE TEAR OF RIGHT ACL, SUBSEQUENT ENCOUNTER: ICD-10-CM

## 2022-09-15 DIAGNOSIS — M25.561 ACUTE POSTOPERATIVE PAIN OF RIGHT KNEE: ICD-10-CM

## 2022-09-15 DIAGNOSIS — G89.18 ACUTE POSTOPERATIVE PAIN OF RIGHT KNEE: ICD-10-CM

## 2022-09-15 DIAGNOSIS — Z98.890 STATUS POST ARTHROSCOPY OF KNEE: ICD-10-CM

## 2022-09-15 PROCEDURE — 97110 THERAPEUTIC EXERCISES: CPT | Performed by: PHYSICAL THERAPIST

## 2022-09-15 PROCEDURE — 97140 MANUAL THERAPY 1/> REGIONS: CPT | Performed by: PHYSICAL THERAPIST

## 2022-09-15 PROCEDURE — 97112 NEUROMUSCULAR REEDUCATION: CPT | Performed by: PHYSICAL THERAPIST

## 2022-09-15 NOTE — PROGRESS NOTES
PT DAILY TREATMENT NOTE    Patient Name: Cecille Uribe  Date:2022  : 1970  [x]  Patient  Verified  Payor: BLUE CROSS / Plan:  [facility] [place] / Product Type: PPO /    Total Treatment Time (min): 60+    Referring Provider: Sommer Alcantara DO    1. Arthrofibrosis of knee joint, right  2. Complete tear of right ACL, subsequent encounter  3. Complete tear of MCL of knee, right, subsequent encounter  4. Status post arthroscopy of knee  5. Acute postoperative pain of right knee    SUBJECTIVE  Subjective functional status/changes:   [] No changes reported    States he has felt more confident with riding bike. Feels prone mobilization techniques last session were helpful. OBJECTIVE/TREATMENT    Manual Therapy x 15 mins:   PF/TF mobilizations to affected limb to promote improved joint mobility. PROM for knee flexion and extension mobility. STM/MFR to the distal IT band, quadriceps, peripatellar structures and popliteal musculature. Anterior hip mobilization in prone. Neuromuscular Re-education (NMR) x 15 minutes:  []  Kinesiotaping applied in a peripatellar \"Y\" pattern to promote lymphatic drainage. []  Neuromuscular reeducation to the VMO with use of [place] electrical stimulation in conjunction with active contraction and exercises. [x]  balance/proprioceptive exercises and activities in clinic as listed below as NMR. [x]  Verbal/tactile cueing for isometric quad contraction  Contract relax techniques in prone to assist with passive mobilization. Therapeutic Exercise x 30 mins:   Strengthening/Endurance/ADL function/Neuromuscular reeducation activities/exercises supervised and completed as listed below. Passive quad and hamstring flexibility exercises in supine/prone, and with leg off table. Verbal cueing given for correct exercise performance. EXERCISE X= completed on  9/15/2022   bike Man.  10' (1)   slantboard x   LAQ/SAQ    TKE blue   Lat steps w/foam --   HS curls -- Cups *NMR x   Balance board *NMR x   TG minisquat B/R uni Level 18/11   Tandem stance (nmr) --   Bridges w/band grn   Box step up/down #2   AP Gait green   Sit-stand x   ABC with ball s   /70   Ecc. phonebooks x             ROM 0-105 on arrival. Achieves near 113 with manual overpressure at end of session.      Added/Changed Exercises:  [x]  Advanced to address: [x] functional strength/ROM deficits [x] balance/proprioceptive tasks  []  Modified: [] per subjective reports [] for patient time constraints [] for clinic time constraints    Modality:  []  E-Stim: type _ x _ min     []att   []unatt   []w/ice   []w/heat  []  Ultrasound: []cont   []pulse    _ W/cm2 x _  min   []1MHz   []3MHz  []  Ice pack: post      []  Hot pack: pre    []  Other:     Patient Education: [] Review HEP    [] Progressed/Changed HEP to include:  [] positioning   [] body mechanics   [] transfers   [] heat/ice application      ASSESSMENT    Still with clear quad weakness and mobility restrictions but patient is pleased with progress and continues to show objective improvements    PLAN  [x]  Upgrade activities as tolerated      [x]  Continue plan of care  []  Discharge due to:  [] Other:       Elina Po, PT, DPT 9/15/2022

## 2022-10-03 ENCOUNTER — OFFICE VISIT (OUTPATIENT)
Dept: ORTHOPEDIC SURGERY | Age: 52
End: 2022-10-03
Payer: COMMERCIAL

## 2022-10-03 DIAGNOSIS — S83.511D COMPLETE TEAR OF RIGHT ACL, SUBSEQUENT ENCOUNTER: ICD-10-CM

## 2022-10-03 DIAGNOSIS — Z98.890 STATUS POST ARTHROSCOPY OF KNEE: ICD-10-CM

## 2022-10-03 DIAGNOSIS — M24.661 ARTHROFIBROSIS OF KNEE JOINT, RIGHT: Primary | ICD-10-CM

## 2022-10-03 DIAGNOSIS — S83.411D COMPLETE TEAR OF MCL OF KNEE, RIGHT, SUBSEQUENT ENCOUNTER: ICD-10-CM

## 2022-10-03 PROCEDURE — 97112 NEUROMUSCULAR REEDUCATION: CPT | Performed by: PHYSICAL THERAPIST

## 2022-10-03 PROCEDURE — 97110 THERAPEUTIC EXERCISES: CPT | Performed by: PHYSICAL THERAPIST

## 2022-10-03 PROCEDURE — 97140 MANUAL THERAPY 1/> REGIONS: CPT | Performed by: PHYSICAL THERAPIST

## 2022-10-03 NOTE — PROGRESS NOTES
PT DAILY TREATMENT NOTE    Patient Name: Carson Durham  Date:2022  : 1970  [x]  Patient  Verified  Payor: BLUE CROSS / Plan:  [facility] [employer] / Product Type: PPO /    Total Treatment Time (min): 60+    Referring Provider: Cliff Hawkins DO    1. Arthrofibrosis of knee joint, right  2. Complete tear of right ACL, subsequent encounter  3. Complete tear of MCL of knee, right, subsequent encounter  4. Status post arthroscopy of knee    SUBJECTIVE  Subjective functional status/changes:   [] No changes reported  First visit since September 15 as patient has been out of town. States he did notice stiffness with prolonged time sitting in the car. Feels his strength is improving but would like to become more confident with riding his bike on a longer trail ride. OBJECTIVE/TREATMENT    Manual Therapy x 15 mins:   PF/TF mobilizations to affected limb to promote improved joint mobility. PROM for knee flexion and extension mobility. STM/MFR to the distal IT band, quadriceps, peripatellar structures and popliteal musculature. Anterior hip mobilization in prone. Neuromuscular Re-education (NMR) x 15 minutes:  []  Kinesiotaping applied in a peripatellar \"Y\" pattern to promote lymphatic drainage. []  Neuromuscular reeducation to the VMO with use of [place] electrical stimulation in conjunction with active contraction and exercises. [x]  balance/proprioceptive exercises and activities in clinic as listed below as NMR. []  Verbal/tactile cueing for isometric quad contraction       Therapeutic Exercise x 30 mins:   Strengthening/Endurance/ADL function/Neuromuscular reeducation activities/exercises supervised and completed as listed below. Passive quad and hamstring flexibility exercises in supine/prone, and with leg off table. EXERCISE X= completed on  10/3/2022   bike Man.  10' (1)   slantboard x   LAQ/SAQ    TKE blue   Lat steps w/foam --   HS curls --   Cups *NMR x   Balance board *NMR x TG minisquat B/R uni Level 18/11   Tandem stance (nmr) --   Bridges w/band grn   Box step up/down #2   AP Gait green   Sit-stand x   ABC with ball s   /80   Ecc. phonebooks x             ROM 0-113 on arrival. Achieves near 115 with manual overpressure at end of session. Added/Changed Exercises:  [x]  Advanced to address: [x] functional strength/ROM deficits [x] balance/proprioceptive tasks  []  Modified: [] per subjective reports [] for patient time constraints [] for clinic time constraints    Modality:  []  E-Stim: type _ x _ min     []att   []unatt   []w/ice   []w/heat  []  Ultrasound: []cont   []pulse    _ W/cm2 x _  min   []1MHz   []3MHz  []  Ice pack: post      []  Hot pack: pre    []  Other:     Patient Education: [] Review HEP    [] Progressed/Changed HEP to include:  [] positioning   [] body mechanics   [] transfers   [] heat/ice application      ASSESSMENT    Continues to slowly improve mobility in all directions but still with some endrange restrictions. Continues to benefit from skilled physical therapy to progress range of motion and return to functional strength.     PLAN  [x]  Upgrade activities as tolerated      [x]  Continue plan of care  []  Discharge due to:  [] Other:       Katrina Kebede, PT, DPT 10/3/2022

## 2022-10-06 ENCOUNTER — OFFICE VISIT (OUTPATIENT)
Dept: ORTHOPEDIC SURGERY | Age: 52
End: 2022-10-06
Payer: COMMERCIAL

## 2022-10-06 DIAGNOSIS — Z98.890 STATUS POST ARTHROSCOPY OF KNEE: ICD-10-CM

## 2022-10-06 DIAGNOSIS — S83.411D COMPLETE TEAR OF MCL OF KNEE, RIGHT, SUBSEQUENT ENCOUNTER: ICD-10-CM

## 2022-10-06 DIAGNOSIS — M24.661 ARTHROFIBROSIS OF KNEE JOINT, RIGHT: Primary | ICD-10-CM

## 2022-10-06 DIAGNOSIS — S83.511D COMPLETE TEAR OF RIGHT ACL, SUBSEQUENT ENCOUNTER: ICD-10-CM

## 2022-10-06 PROCEDURE — 97110 THERAPEUTIC EXERCISES: CPT | Performed by: PHYSICAL THERAPIST

## 2022-10-06 PROCEDURE — 97140 MANUAL THERAPY 1/> REGIONS: CPT | Performed by: PHYSICAL THERAPIST

## 2022-10-06 PROCEDURE — 97112 NEUROMUSCULAR REEDUCATION: CPT | Performed by: PHYSICAL THERAPIST

## 2022-10-06 NOTE — PROGRESS NOTES
PT DAILY TREATMENT NOTE    Patient Name: Carson Durham  Date:2022  : 1970  [x]  Patient  Verified  Payor: BLUE CROSS / Plan:  [facility] / Product Type: PPO /    Total Treatment Time (min): 60+    Referring Provider: Cliff Hawkins DO    1. Arthrofibrosis of knee joint, right  2. Complete tear of right ACL, subsequent encounter  3. Complete tear of MCL of knee, right, subsequent encounter  4. Status post arthroscopy of knee    SUBJECTIVE  Subjective functional status/changes:   [] No changes reported    Continues to be pleased with recent progress but would like to further progress both ROM and functional strength. OBJECTIVE/TREATMENT    Manual Therapy x 15 mins:   PF/TF mobilizations to affected limb to promote improved joint mobility. PROM for knee flexion and extension mobility. STM/MFR to the distal IT band, quadriceps, peripatellar structures and popliteal musculature. Neuromuscular Re-education (NMR) x 15 minutes:  []  Kinesiotaping applied in a peripatellar \"Y\" pattern to promote lymphatic drainage. []  Neuromuscular reeducation to the VMO with use of [place] electrical stimulation in conjunction with active contraction and exercises. [x]  balance/proprioceptive exercises and activities in clinic as listed below as NMR. []  Verbal/tactile cueing for isometric quad contraction       Therapeutic Exercise x 30 mins:   Strengthening/Endurance/ADL function/Neuromuscular reeducation activities/exercises supervised and completed as listed below. Passive quad and hamstring flexibility exercises in supine/prone, and with leg off table. EXERCISE X= completed on  10/6/2022   bike Man. 10' (1)   slantboard x   LAQ/SAQ    TKE blue   Lat steps w/foam --   HS curls --   Cups *NMR x   Balance board *NMR x   TG minisquat B/R uni Level 18/11   Tandem stance (nmr) --   Bridges w/band grn   Box step up/down #2   AP Gait green   Sit-stand x   ABC with ball s   /80   Ecc. phonebooks x             ROM 0-110 on arrival. Achieves near 118 with manual overpressure at end of session. Added/Changed Exercises:  [x]  Advanced to address: [x] functional strength/ROM deficits [x] balance/proprioceptive tasks  []  Modified: [] per subjective reports [] for patient time constraints [] for clinic time constraints    Modality:  []  E-Stim: type _ x _ min     []att   []unatt   []w/ice   []w/heat  []  Ultrasound: []cont   []pulse    _ W/cm2 x _  min   []1MHz   []3MHz  []  Ice pack: post      []  Hot pack: pre    []  Other:     Patient Education: [] Review HEP    [] Progressed/Changed HEP to include:  [] positioning   [] body mechanics   [] transfers   [] heat/ice application      ASSESSMENT    Still making slow but gradual progress with ROM. Needs to continue to work on isolating quad activation and overall LE strength.      PLAN  [x]  Upgrade activities as tolerated      [x]  Continue plan of care  []  Discharge due to:  [] Other:       Chaparrita Piedra, PT, DPT 10/6/2022

## 2022-10-07 DIAGNOSIS — Z98.890 STATUS POST ARTHROSCOPY OF KNEE: ICD-10-CM

## 2022-10-07 DIAGNOSIS — M24.661 ARTHROFIBROSIS OF KNEE JOINT, RIGHT: ICD-10-CM

## 2022-10-07 RX ORDER — CYCLOBENZAPRINE HCL 10 MG
TABLET ORAL
Qty: 30 TABLET | Refills: 0 | Status: SHIPPED | OUTPATIENT
Start: 2022-10-07

## 2022-10-11 ENCOUNTER — OFFICE VISIT (OUTPATIENT)
Dept: ORTHOPEDIC SURGERY | Age: 52
End: 2022-10-11
Payer: COMMERCIAL

## 2022-10-11 DIAGNOSIS — S83.411D COMPLETE TEAR OF MCL OF KNEE, RIGHT, SUBSEQUENT ENCOUNTER: ICD-10-CM

## 2022-10-11 DIAGNOSIS — Z98.890 STATUS POST ARTHROSCOPY OF KNEE: Primary | ICD-10-CM

## 2022-10-11 DIAGNOSIS — S83.511D COMPLETE TEAR OF RIGHT ACL, SUBSEQUENT ENCOUNTER: ICD-10-CM

## 2022-10-11 DIAGNOSIS — M24.661 ARTHROFIBROSIS OF KNEE JOINT, RIGHT: ICD-10-CM

## 2022-10-11 PROCEDURE — 97140 MANUAL THERAPY 1/> REGIONS: CPT | Performed by: PHYSICAL THERAPIST

## 2022-10-11 PROCEDURE — 97112 NEUROMUSCULAR REEDUCATION: CPT | Performed by: PHYSICAL THERAPIST

## 2022-10-11 PROCEDURE — 97110 THERAPEUTIC EXERCISES: CPT | Performed by: PHYSICAL THERAPIST

## 2022-10-11 NOTE — PROGRESS NOTES
PT DAILY TREATMENT NOTE    Patient Name: Marissa Chawla  Date:2022  : 1970  [x]  Patient  Verified  Payor: BLUE CROSS / Plan:  [facility] [place] / Product Type: PPO /    Total Treatment Time (min): 60+    Referring Provider: Ursula Mtz DO    1. Status post arthroscopy of knee  2. Arthrofibrosis of knee joint, right  3. Complete tear of right ACL, subsequent encounter  4. Complete tear of MCL of knee, right, subsequent encounter    SUBJECTIVE  Subjective functional status/changes:   [] No changes reported    Patient states he has joined a gym with a pool to give him additional exercise options outside of PT.     OBJECTIVE/TREATMENT    Manual Therapy x 15 mins:   PF/TF mobilizations to affected limb to promote improved joint mobility while supine/prone. PROM for knee flexion and extension mobility. STM/MFR to the distal IT band, quadriceps, peripatellar structures and popliteal musculature. Neuromuscular Re-education (NMR) x 15 minutes:  []  Kinesiotaping applied in a peripatellar \"Y\" pattern to promote lymphatic drainage. []  Neuromuscular reeducation to the VMO with use of [place] electrical stimulation in conjunction with active contraction and exercises. [x]  balance/proprioceptive exercises and activities in clinic as listed below as NMR. []  Verbal/tactile cueing for isometric quad contraction       Therapeutic Exercise x 30 mins:   Strengthening/Endurance/ADL function/Neuromuscular reeducation activities/exercises supervised and completed as listed below. Passive quad and hamstring flexibility exercises in supine/prone, and with leg off table. EXERCISE X= completed on  10/11/2022   bike Man.  10' (1)   slantboard x   LAQ/SAQ    TKE blue   Lat steps w/foam --   HS curls --   Cups *NMR x   Balance board *NMR x   TG minisquat B/R uni Level 18/11   Tandem stance (nmr) --   Bridges w/band grn   Box step up/down #2   AP Gait green   Sit-stand x   ABC with ball s   /80 Ecc. phonebooks x             *limited some of today's exercise secondary to patient time constraints with visit*    Achieves near 119 with manual overpressure at end of session. Added/Changed Exercises:  [x]  Advanced to address: [x] functional strength/ROM deficits [x] balance/proprioceptive tasks  []  Modified: [] per subjective reports [] for patient time constraints [] for clinic time constraints    Modality:  []  E-Stim: type _ x _ min     []att   []unatt   []w/ice   []w/heat  []  Ultrasound: []cont   []pulse    _ W/cm2 x _  min   []1MHz   []3MHz  []  Ice pack: post      []  Hot pack: pre    []  Other:     Patient Education: [] Review HEP    [] Progressed/Changed HEP to include:  [] positioning   [] body mechanics   [] transfers   [] heat/ice application      ASSESSMENT    Slowly still making incremental improvements in range of motion however patient does need some skilled PT for endrange mobilization and guidance for exercise progression.     PLAN  [x]  Upgrade activities as tolerated      [x]  Continue plan of care  []  Discharge due to:  [] Other:       Philip Barker, PT, DPT 10/11/2022

## 2022-10-13 ENCOUNTER — OFFICE VISIT (OUTPATIENT)
Dept: ORTHOPEDIC SURGERY | Age: 52
End: 2022-10-13
Payer: COMMERCIAL

## 2022-10-13 DIAGNOSIS — S83.411D COMPLETE TEAR OF MCL OF KNEE, RIGHT, SUBSEQUENT ENCOUNTER: ICD-10-CM

## 2022-10-13 DIAGNOSIS — S83.511D COMPLETE TEAR OF RIGHT ACL, SUBSEQUENT ENCOUNTER: ICD-10-CM

## 2022-10-13 DIAGNOSIS — Z98.890 STATUS POST ARTHROSCOPY OF KNEE: ICD-10-CM

## 2022-10-13 DIAGNOSIS — M24.661 ARTHROFIBROSIS OF KNEE JOINT, RIGHT: Primary | ICD-10-CM

## 2022-10-13 PROCEDURE — 97112 NEUROMUSCULAR REEDUCATION: CPT | Performed by: PHYSICAL THERAPIST

## 2022-10-13 PROCEDURE — 97110 THERAPEUTIC EXERCISES: CPT | Performed by: PHYSICAL THERAPIST

## 2022-10-13 PROCEDURE — 97140 MANUAL THERAPY 1/> REGIONS: CPT | Performed by: PHYSICAL THERAPIST

## 2022-10-13 NOTE — PROGRESS NOTES
PT DAILY TREATMENT NOTE    Patient Name: Kiera Luther  Date:2022  : 1970  [x]  Patient  Verified  Payor: BLUE CROSS / Plan:  [facility] / Product Type: PPO /    Total Treatment Time (min): 60+    Referring Provider: Carlo Khalil DO    1. Arthrofibrosis of knee joint, right  2. Status post arthroscopy of knee  3. Complete tear of right ACL, subsequent encounter  4. Complete tear of MCL of knee, right, subsequent encounter    SUBJECTIVE  Subjective functional status/changes:   [] No changes reported    Patient has been exercising at the gym and changing his diet. States he does have some questions regarding his gym exercises. OBJECTIVE/TREATMENT    Manual Therapy x 15 mins:   PF/TF mobilizations to affected limb to promote improved joint mobility while supine/prone. PROM for knee flexion and extension mobility. STM/MFR to the distal IT band, quadriceps, peripatellar structures and popliteal musculature. Neuromuscular Re-education (NMR) x 15 minutes:  []  Kinesiotaping applied in a peripatellar \"Y\" pattern to promote lymphatic drainage. []  Neuromuscular reeducation to the VMO with use of [place] electrical stimulation in conjunction with active contraction and exercises. [x]  balance/proprioceptive exercises and activities in clinic as listed below as NMR. [x]  Verbal/tactile cueing for isometric quad contraction       Therapeutic Exercise x 30 mins:   Strengthening/Endurance/ADL function/Neuromuscular reeducation activities/exercises supervised and completed as listed below. Passive quad and hamstring flexibility exercises in supine/prone, and with leg off table. EXERCISE X= completed on  10/13/2022   bike random.  10' (3.5)   slantboard x   LAQ/SAQ    TKE blue   Lat steps w/foam --   HS curls --   Cups *NMR x   Balance board *NMR x   TG minisquat B/R uni Level    Tandem stance (nmr) --   Bridges w/band grn   Box step up/down #2   4way Gait blue   Sit-stand x ABC with ball s   /80   Ecc. phonebooks x   Single leg RDL y           Achieves near 120 with manual overpressure at end of session. Added/Changed Exercises:  [x]  Advanced to address: [x] functional strength/ROM deficits [x] balance/proprioceptive tasks  []  Modified: [] per subjective reports [] for patient time constraints [] for clinic time constraints    Modality:  []  E-Stim: type _ x _ min     []att   []unatt   []w/ice   []w/heat  []  Ultrasound: []cont   []pulse    _ W/cm2 x _  min   []1MHz   []3MHz  []  Ice pack: post      []  Hot pack: pre    []  Other:     Patient Education: [] Review HEP    [] Progressed/Changed HEP to include:  [] positioning   [] body mechanics   [] transfers   [] heat/ice application      ASSESSMENT    Still with obvious quad weakness. Lacks a few degrees of endrange flexion but improving. Continues to make gains with current physical therapy plan of care.     PLAN  [x]  Upgrade activities as tolerated      [x]  Continue plan of care  []  Discharge due to:  [] Other:       Patricia Freeman, PT, DPT 10/13/2022

## 2022-10-17 ENCOUNTER — OFFICE VISIT (OUTPATIENT)
Dept: ORTHOPEDIC SURGERY | Age: 52
End: 2022-10-17
Payer: COMMERCIAL

## 2022-10-17 DIAGNOSIS — M24.661 ARTHROFIBROSIS OF KNEE JOINT, RIGHT: Primary | ICD-10-CM

## 2022-10-17 DIAGNOSIS — S83.511D COMPLETE TEAR OF RIGHT ACL, SUBSEQUENT ENCOUNTER: ICD-10-CM

## 2022-10-17 DIAGNOSIS — S83.411D COMPLETE TEAR OF MCL OF KNEE, RIGHT, SUBSEQUENT ENCOUNTER: ICD-10-CM

## 2022-10-17 DIAGNOSIS — Z98.890 STATUS POST ARTHROSCOPY OF KNEE: ICD-10-CM

## 2022-10-17 PROCEDURE — 97110 THERAPEUTIC EXERCISES: CPT | Performed by: PHYSICAL THERAPIST

## 2022-10-17 PROCEDURE — 97140 MANUAL THERAPY 1/> REGIONS: CPT | Performed by: PHYSICAL THERAPIST

## 2022-10-17 PROCEDURE — 97112 NEUROMUSCULAR REEDUCATION: CPT | Performed by: PHYSICAL THERAPIST

## 2022-10-17 NOTE — PROGRESS NOTES
PT DAILY TREATMENT NOTE    Patient Name: Vinayak Canales  Date:2022  : 1970  [x]  Patient  Verified  Payor: BLUE CROSS / Plan:  [facility] / Product Type: PPO /    Total Treatment Time (min): 60+    Referring Provider: Fadi Hurtado DO    1. Arthrofibrosis of knee joint, right  2. Status post arthroscopy of knee  3. Complete tear of right ACL, subsequent encounter  4. Complete tear of MCL of knee, right, subsequent encounter    SUBJECTIVE  Subjective functional status/changes:   [] No changes reported    Patient reports his anterior knee and infrapatellar region were sore after increasing his bike riding over the weekend. OBJECTIVE/TREATMENT    Manual Therapy x 15 mins:   PF/TF mobilizations to affected limb to promote improved joint mobility while supine/sidelying. PROM for knee flexion and extension mobility. STM/MFR to the distal IT band, quadriceps, peripatellar structures and popliteal musculature. Neuromuscular Re-education (NMR) x 15 minutes:  []  Kinesiotaping applied in a peripatellar \"Y\" pattern to promote lymphatic drainage. []  Neuromuscular reeducation to the VMO with use of [place] electrical stimulation in conjunction with active contraction and exercises. [x]  balance/proprioceptive exercises and activities in clinic as listed below as NMR. [x]  Verbal/tactile cueing for isometric quad contraction       Therapeutic Exercise x 30 mins:   Strengthening/Endurance/ADL function/Neuromuscular reeducation activities/exercises supervised and completed as listed below. Passive quad and hamstring flexibility exercises in supine, sidelying, and with leg off table. EXERCISE X= completed on  10/17/2022   bike random.  10' (3.5)   slantboard x   LAQ/SAQ    TKE blue   Lat steps w/foam --   HS curls --   Cups *NMR x   Balance board *NMR x   TG minisquat B/R uni Level    Tandem stance (nmr) --   Bridges w/band grn   Box step up/down #2   4way Gait purple   Sit-stand x   ABC with ball s   /80   Ecc. phonebooks x   Single leg RDL y   [place]       Achieves near 120 but requires less manual overpressure as compared to previous visit. Added/Changed Exercises:  [x]  Advanced to address: [x] functional strength/ROM deficits [x] balance/proprioceptive tasks  []  Modified: [] per subjective reports [] for patient time constraints [] for clinic time constraints    Modality:  []  E-Stim: type _ x _ min     []att   []unatt   []w/ice   []w/heat  []  Ultrasound: []cont   []pulse    _ W/cm2 x _  min   []1MHz   []3MHz  []  Ice pack: post      []  Hot pack: pre    []  Other:     Patient Education: [] Review HEP    [] Progressed/Changed HEP to include:  [] positioning   [] body mechanics   [] transfers   [] heat/ice application      ASSESSMENT    Still with generalized quad weakness but patient is progressing exercise here in the clinic. Increasing exercise outside the clinic as well, although still dealing with some subjective tightness and soreness.     PLAN  [x]  Upgrade activities as tolerated      [x]  Continue plan of care  []  Discharge due to:  [] Other:       Anahi Bermudez, PT, DPT 10/17/2022

## 2022-10-20 ENCOUNTER — OFFICE VISIT (OUTPATIENT)
Dept: ORTHOPEDIC SURGERY | Age: 52
End: 2022-10-20
Payer: COMMERCIAL

## 2022-10-20 DIAGNOSIS — S83.511D COMPLETE TEAR OF RIGHT ACL, SUBSEQUENT ENCOUNTER: ICD-10-CM

## 2022-10-20 DIAGNOSIS — Z98.890 STATUS POST ARTHROSCOPY OF KNEE: ICD-10-CM

## 2022-10-20 DIAGNOSIS — S83.411D COMPLETE TEAR OF MCL OF KNEE, RIGHT, SUBSEQUENT ENCOUNTER: ICD-10-CM

## 2022-10-20 DIAGNOSIS — M24.661 ARTHROFIBROSIS OF KNEE JOINT, RIGHT: Primary | ICD-10-CM

## 2022-10-20 PROCEDURE — 97140 MANUAL THERAPY 1/> REGIONS: CPT | Performed by: PHYSICAL THERAPIST

## 2022-10-20 PROCEDURE — 97112 NEUROMUSCULAR REEDUCATION: CPT | Performed by: PHYSICAL THERAPIST

## 2022-10-20 PROCEDURE — 97110 THERAPEUTIC EXERCISES: CPT | Performed by: PHYSICAL THERAPIST

## 2022-10-20 NOTE — PROGRESS NOTES
PT DAILY TREATMENT NOTE    Patient Name: Marie Pretty  Date:2022  : 1970  [x]  Patient  Verified  Payor: BLUE CROSS / Plan:  [facility] / Product Type: PPO /    Total Treatment Time (min): 60+    Referring Provider: Adriana Casey DO    1. Arthrofibrosis of knee joint, right  2. Status post arthroscopy of knee  3. Complete tear of right ACL, subsequent encounter  4. Complete tear of MCL of knee, right, subsequent encounter    SUBJECTIVE  Subjective functional status/changes:   [] No changes reported    Retro patella soreness from earlier in the week seems improved. Admits his schedule sometimes interferes with gym attendance. Still feels need to alter his gait with descending stairs. OBJECTIVE/TREATMENT    Manual Therapy x 15 mins:   PF/TF mobilizations to affected limb to promote improved joint mobility while supine/prone. PROM for knee flexion and extension mobility in supine/prone. STM/MFR to the distal IT band, quadriceps, peripatellar structures and popliteal musculature. Neuromuscular Re-education (NMR) x 15 minutes:  []  Kinesiotaping applied in a peripatellar \"Y\" pattern to promote lymphatic drainage. []  Neuromuscular reeducation to the VMO with use of [place] electrical stimulation in conjunction with active contraction and exercises. [x]  balance/proprioceptive exercises and activities in clinic as listed below as NMR. [x]  Verbal/tactile cueing for isometric quad contraction       Therapeutic Exercise x 30 mins:   Strengthening/Endurance/ADL function/Neuromuscular reeducation activities/exercises supervised and completed as listed below. Passive quad and hamstring flexibility exercises in supine, sidelying, and with leg off table. EXERCISE X= completed on  10/20/2022   bike random.  10' (3.5)   slantboard x   LAQ/SAQ    TKE blue   Lat steps w/foam --   HS curls --   Cups *NMR x   Balance board *NMR x   TG minisquat B/R uni Level 18/11   Tandem stance ([facility]) --   Layne Shaper w/band grn   Box step up/down #2   4way Gait purple   Sit-stand x   ABC with ball s   /80   Ecc. phonebooks x   Single leg RDL y   [place]   Walking lunge    y  Lat lunge to [facility]   y    ROM 0-120. Added/Changed Exercises:  [x]  Advanced to address: [x] functional strength/ROM deficits [x] balance/proprioceptive tasks  []  Modified: [] per subjective reports [] for patient time constraints [] for clinic time constraints    Modality:  []  E-Stim: type _ x _ min     []att   []unatt   []w/ice   []w/heat  []  Ultrasound: []cont   []pulse    _ W/cm2 x _  min   []1MHz   []3MHz  []  Ice pack: post      []  Hot pack: pre    []  Other:     Patient Education: [] Review HEP    [] Progressed/Changed HEP to include:  [] positioning   [] body mechanics   [] transfers   [] heat/ice application      ASSESSMENT    ROM consistent with previous visit but increases exercise here in clinic. Still benefits with some cueing on exercise technique.        PLAN  [x]  Upgrade activities as tolerated      [x]  Continue plan of care  []  Discharge due to:  [] Other:       Meri Sánchez, PT, DPT 10/20/2022

## 2022-11-03 ENCOUNTER — OFFICE VISIT (OUTPATIENT)
Dept: ORTHOPEDIC SURGERY | Age: 52
End: 2022-11-03
Payer: COMMERCIAL

## 2022-11-03 DIAGNOSIS — S83.511D COMPLETE TEAR OF RIGHT ACL, SUBSEQUENT ENCOUNTER: ICD-10-CM

## 2022-11-03 DIAGNOSIS — M24.661 ARTHROFIBROSIS OF KNEE JOINT, RIGHT: Primary | ICD-10-CM

## 2022-11-03 DIAGNOSIS — Z98.890 STATUS POST ARTHROSCOPY OF KNEE: ICD-10-CM

## 2022-11-03 DIAGNOSIS — S83.411D COMPLETE TEAR OF MCL OF KNEE, RIGHT, SUBSEQUENT ENCOUNTER: ICD-10-CM

## 2022-11-03 PROCEDURE — 97140 MANUAL THERAPY 1/> REGIONS: CPT | Performed by: PHYSICAL THERAPIST

## 2022-11-03 PROCEDURE — 97110 THERAPEUTIC EXERCISES: CPT | Performed by: PHYSICAL THERAPIST

## 2022-11-03 PROCEDURE — 97112 NEUROMUSCULAR REEDUCATION: CPT | Performed by: PHYSICAL THERAPIST

## 2022-11-03 NOTE — PROGRESS NOTES
PT DAILY TREATMENT NOTE    Patient Name: Tara Ray  Date:2022  : 1970  [x]  Patient  Verified  Payor: BLUE CROSS / Plan:  [facility] [place] / Product Type: PPO /    Total Treatment Time (min): 45+ shortened session as patient arrives late and needs to leave early    Referring Provider: Alber Alcala, DO    1. Arthrofibrosis of knee joint, right  2. Status post arthroscopy of knee  3. Complete tear of right ACL, subsequent encounter  4. Complete tear of MCL of knee, right, subsequent encounter    SUBJECTIVE  Subjective functional status/changes:   [] No changes reported    Stiff/sore after playing pickleball. Has been increasing bike riding. Would like to start hiking. OBJECTIVE/TREATMENT    Manual Therapy x 15 mins:   PF/TF mobilizations to affected limb to promote improved joint mobility while supine/prone. PROM for knee flexion and extension mobility in supine/prone. STM/MFR to the distal IT band, quadriceps, peripatellar structures and popliteal musculature. Neuromuscular Re-education (NMR) x 15 minutes:  []  Kinesiotaping applied in a peripatellar \"Y\" pattern to promote lymphatic drainage. []  Neuromuscular reeducation to the VMO with use of [place] electrical stimulation in conjunction with active contraction and exercises. [x]  balance/proprioceptive exercises and activities in clinic as listed below as NMR. [x]  Verbal/tactile cueing for isometric quad contraction       Therapeutic Exercise x 15 mins:   Strengthening/Endurance/ADL function/Neuromuscular reeducation activities/exercises supervised and completed as listed below. Passive quad and hamstring flexibility exercises in supine, sidelying, and with leg off table. EXERCISE X= completed on  11/3/2022   bike random.  10' (3.5)   slantboard x   LAQ/SAQ    TKE blue   Lat steps w/foam --   HS curls --   Cups *NMR x   Balance board *NMR x   TG minisquat B/R uni Level /   Tandem stance (nmr) --   [facility] w/band grn   Box step up/down #2   4way Gait purple   Sit-stand x   ABC with ball s   /80   Ecc. phonebooks x   Single leg RDL y   [place]   Walking lunge    y  Lat lunge to [employer]    . Added/Changed Exercises:  [x]  Advanced to address: [x] functional strength/ROM deficits [x] balance/proprioceptive tasks  []  Modified: [] per subjective reports [] for patient time constraints [] for clinic time constraints    Modality:  []  E-Stim: type _ x _ min     []att   []unatt   []w/ice   []w/heat  []  Ultrasound: []cont   []pulse    _ W/cm2 x _  min   []1MHz   []3MHz  []  Ice pack: post      []  Hot pack: pre    []  Other:     Patient Education: [] Review HEP    [] Progressed/Changed HEP to include:  [] positioning   [] body mechanics   [] transfers   [] heat/ice application      ASSESSMENT      Needs some cueing with squat/lunge technique. Needs to work on strength/proprio. Stable enough to increase controlled activity but still too weak to begin any recreational jogging.      PLAN  [x]  Upgrade activities as tolerated      [x]  Continue plan of care  []  Discharge due to:  [] Other:       Heydi Gonsalves, PT, DPT 11/3/2022

## 2022-11-10 ENCOUNTER — OFFICE VISIT (OUTPATIENT)
Dept: ORTHOPEDIC SURGERY | Age: 52
End: 2022-11-10
Payer: COMMERCIAL

## 2022-11-10 DIAGNOSIS — S83.511D COMPLETE TEAR OF RIGHT ACL, SUBSEQUENT ENCOUNTER: ICD-10-CM

## 2022-11-10 DIAGNOSIS — G89.18 ACUTE POSTOPERATIVE PAIN OF RIGHT KNEE: ICD-10-CM

## 2022-11-10 DIAGNOSIS — M25.561 ACUTE POSTOPERATIVE PAIN OF RIGHT KNEE: ICD-10-CM

## 2022-11-10 DIAGNOSIS — S83.411D COMPLETE TEAR OF MCL OF KNEE, RIGHT, SUBSEQUENT ENCOUNTER: ICD-10-CM

## 2022-11-10 DIAGNOSIS — Z98.890 STATUS POST ARTHROSCOPY OF KNEE: ICD-10-CM

## 2022-11-10 DIAGNOSIS — M24.661 ARTHROFIBROSIS OF KNEE JOINT, RIGHT: Primary | ICD-10-CM

## 2022-11-10 PROCEDURE — 97110 THERAPEUTIC EXERCISES: CPT | Performed by: PHYSICAL THERAPIST

## 2022-11-10 PROCEDURE — 97112 NEUROMUSCULAR REEDUCATION: CPT | Performed by: PHYSICAL THERAPIST

## 2022-11-10 PROCEDURE — 97140 MANUAL THERAPY 1/> REGIONS: CPT | Performed by: PHYSICAL THERAPIST

## 2022-11-10 NOTE — PROGRESS NOTES
PT DAILY TREATMENT NOTE    Patient Name: Vinayak Canales  Date:2022  : 1970  [x]  Patient  Verified  Payor: BLUE CROSS / Plan:  [facility] / Product Type: PPO /    Total Treatment Time (min): 60+  Referring Provider: Fadi Hurtado DO    1. Arthrofibrosis of knee joint, right  2. Status post arthroscopy of knee  3. Complete tear of right ACL, subsequent encounter  4. Complete tear of MCL of knee, right, subsequent encounter  5. Acute postoperative pain of right knee    SUBJECTIVE  Subjective functional status/changes:   [] No changes reported    Patient reports his knee has been feeling stiff lately. OBJECTIVE/TREATMENT    Manual Therapy x 15 mins:   PF/TF mobilizations to affected limb to promote improved joint mobility while supine/prone. PROM for knee flexion and extension mobility in supine/prone. STM/MFR to the distal IT band, quadriceps, peripatellar structures and popliteal musculature. Neuromuscular Re-education (NMR) x 15 minutes:  []  Kinesiotaping applied in a peripatellar \"Y\" pattern to promote lymphatic drainage. []  Neuromuscular reeducation to the VMO with use of [place] electrical stimulation in conjunction with active contraction and exercises. [x]  balance/proprioceptive exercises and activities in clinic as listed below as NMR. [x]  Verbal/tactile cueing for isometric quad contraction       Therapeutic Exercise x 30 mins:   Strengthening/Endurance/ADL function/Neuromuscular reeducation activities/exercises supervised and completed as listed below. Passive quad and hamstring flexibility exercises in supine, sidelying, and with leg off table. EXERCISE X= completed on  11/10/2022   [employer] random.  10' (3.5)   slantboard x   LAQ/SAQ    TKE blue   Lat steps w/foam --   HS curls --   Cups *NMR x   Balance board *NMR x   TG minisquat B/R uni Level    Tandem stance (nmr) --   Bridges w/band grn   Box step up/down #2   4way Gait purple   Sit-stand x ABC with ball s   /100   Ecc. phonebooks x   Single leg RDL y   [place]   Walking lunge    y  Lat lunge to [employer]   y  [place]/skipping   y  . Added/Changed Exercises:  [x]  Advanced to address: [x] functional strength/ROM deficits [x] balance/proprioceptive tasks  []  Modified: [] per subjective reports [] for patient time constraints [] for clinic time constraints    Modality:  []  E-Stim: type _ x _ min     []att   []unatt   []w/ice   []w/heat  []  Ultrasound: []cont   []pulse    _ W/cm2 x _  min   []1MHz   []3MHz  []  Ice pack: post      []  Hot pack: pre    []  Other:     Patient Education: [] Review HEP    [] Progressed/Changed HEP to include:  [] positioning   [] body mechanics   [] transfers   [] heat/ice application      ASSESSMENT    Began some gradual increase in early plyometric drills at up tempo speeds with good tolerance but appropriate difficulty.     PLAN  [x]  Upgrade activities as tolerated      [x]  Continue plan of care  []  Discharge due to:  [] Other:       Jevon Ritter, PT, DPT 11/10/2022

## 2022-11-17 ENCOUNTER — OFFICE VISIT (OUTPATIENT)
Dept: ORTHOPEDIC SURGERY | Age: 52
End: 2022-11-17
Payer: COMMERCIAL

## 2022-11-17 DIAGNOSIS — M25.561 ACUTE POSTOPERATIVE PAIN OF RIGHT KNEE: ICD-10-CM

## 2022-11-17 DIAGNOSIS — G89.18 ACUTE POSTOPERATIVE PAIN OF RIGHT KNEE: ICD-10-CM

## 2022-11-17 DIAGNOSIS — S83.411D COMPLETE TEAR OF MCL OF KNEE, RIGHT, SUBSEQUENT ENCOUNTER: ICD-10-CM

## 2022-11-17 DIAGNOSIS — M24.661 ARTHROFIBROSIS OF KNEE JOINT, RIGHT: Primary | ICD-10-CM

## 2022-11-17 DIAGNOSIS — S83.511D COMPLETE TEAR OF RIGHT ACL, SUBSEQUENT ENCOUNTER: ICD-10-CM

## 2022-11-17 PROCEDURE — 97140 MANUAL THERAPY 1/> REGIONS: CPT | Performed by: PHYSICAL THERAPIST

## 2022-11-17 PROCEDURE — 97110 THERAPEUTIC EXERCISES: CPT | Performed by: PHYSICAL THERAPIST

## 2022-11-17 PROCEDURE — 97112 NEUROMUSCULAR REEDUCATION: CPT | Performed by: PHYSICAL THERAPIST

## 2022-11-17 NOTE — PROGRESS NOTES
PT DAILY TREATMENT NOTE    Patient Name: Theresa Lu  Date:2022  : 1970  [x]  Patient  Verified  Payor: BLUE CROSS / Plan:  [facility] / Product Type: PPO /    Total Treatment Time (min): 60+  Referring Provider: Vladimir Corea DO    1. Arthrofibrosis of knee joint, right  2. Complete tear of right ACL, subsequent encounter  3. Complete tear of MCL of knee, right, subsequent encounter  4. Acute postoperative pain of right knee    SUBJECTIVE  Subjective functional status/changes:   [] No changes reported    \"Feels stiff. \" Patient admits that work schedule limits any exercise more than walking during the workweek. Hopes to ride his bike again this weekend. OBJECTIVE/TREATMENT    Manual Therapy x 15 mins:   PF/TF mobilizations to affected limb to promote improved joint mobility while supine/prone. PROM for knee flexion and extension mobility in supine/prone. STM/MFR to the distal IT band, quadriceps, peripatellar structures and popliteal musculature. Neuromuscular Re-education (NMR) x 15 minutes:  []  Kinesiotaping applied in a peripatellar \"Y\" pattern to promote lymphatic drainage. []  Neuromuscular reeducation to the VMO with use of [place] electrical stimulation in conjunction with active contraction and exercises. [x]  balance/proprioceptive exercises and activities in clinic as listed below as NMR. [x]  Verbal/tactile cueing for isometric quad contraction       Therapeutic Exercise x 30 mins:   Strengthening/Endurance/ADL function/Neuromuscular reeducation activities/exercises supervised and completed as listed below. Passive quad and hamstring flexibility exercises in supine, sidelying, and with leg off table. EXERCISE X= completed on  2022   bike random.  10' (3.5)   slantboard x   LAQ/SAQ    TKE blue   Lat steps w/foam --   HS curls --   Cups *NMR x   Balance board *NMR x   TG minisquat B/R uni Level    Tandem stance (nmr) --   Bridges w/band grn   Box step up/down #2   4way Gait purple   Sit-stand x   ABC with ball s   /100   Ecc. phonebooks x   Single leg RDL y   [place]   Walking lunge    y  Lat lunge to [employer]   y  [place]/skipping   y  . Added/Changed Exercises:  [x]  Advanced to address: [x] functional strength/ROM deficits [x] balance/proprioceptive tasks  []  Modified: [] per subjective reports [] for patient time constraints [] for clinic time constraints    Modality:  []  E-Stim: type _ x _ min     []att   []unatt   []w/ice   []w/heat  []  Ultrasound: []cont   []pulse    _ W/cm2 x _  min   []1MHz   []3MHz  []  Ice pack: post      []  Hot pack: pre    []  Other:     Patient Education: [] Review HEP    [] Progressed/Changed HEP to include:  [] positioning   [] body mechanics   [] transfers   [] heat/ice application      ROM 4-336    ASSESSMENT    Flexion shows objective improvement. Avoids right knee flexion with left leg strides forward in to lunge position. Needs to be diligent with HEP during the week as well to maximize strength and return to desired recreational activity.      PLAN  [x]  Upgrade activities as tolerated      [x]  Continue plan of care  []  Discharge due to:  [] Other:       Meri Sánchez, PT, DPT 11/17/2022

## 2022-11-28 ENCOUNTER — OFFICE VISIT (OUTPATIENT)
Dept: ORTHOPEDIC SURGERY | Age: 52
End: 2022-11-28
Payer: COMMERCIAL

## 2022-11-28 DIAGNOSIS — M25.561 ACUTE POSTOPERATIVE PAIN OF RIGHT KNEE: ICD-10-CM

## 2022-11-28 DIAGNOSIS — S83.411D COMPLETE TEAR OF MCL OF KNEE, RIGHT, SUBSEQUENT ENCOUNTER: ICD-10-CM

## 2022-11-28 DIAGNOSIS — G89.18 ACUTE POSTOPERATIVE PAIN OF RIGHT KNEE: ICD-10-CM

## 2022-11-28 DIAGNOSIS — S83.511D COMPLETE TEAR OF RIGHT ACL, SUBSEQUENT ENCOUNTER: ICD-10-CM

## 2022-11-28 DIAGNOSIS — M24.661 ARTHROFIBROSIS OF KNEE JOINT, RIGHT: Primary | ICD-10-CM

## 2022-11-28 PROCEDURE — 97110 THERAPEUTIC EXERCISES: CPT | Performed by: PHYSICAL THERAPIST

## 2022-11-28 PROCEDURE — 97140 MANUAL THERAPY 1/> REGIONS: CPT | Performed by: PHYSICAL THERAPIST

## 2022-11-28 PROCEDURE — 97112 NEUROMUSCULAR REEDUCATION: CPT | Performed by: PHYSICAL THERAPIST

## 2022-11-28 NOTE — PROGRESS NOTES
PT DAILY TREATMENT NOTE    Patient Name: Carmelina Bell  Date:2022  : 1970  [x]  Patient  Verified  Payor: BLUE CROSS / Plan:  [facility] / Product Type: PPO /    Total Treatment Time (min): 60+  Referring Provider: Shakira Gonzales DO    1. Arthrofibrosis of knee joint, right  2. Complete tear of right ACL, subsequent encounter  3. Complete tear of MCL of knee, right, subsequent encounter  4. Acute postoperative pain of right knee    SUBJECTIVE  Subjective functional status/changes:   [] No changes reported    Patient reports his knee was fatigued with walking on the beach over the weekend. Has been trying to increase his time biking. States he is attending exercise at the gym as his schedule allows. OBJECTIVE/TREATMENT    Manual Therapy x 15 mins:   PF/TF mobilizations to affected limb to promote improved joint mobility while supine/prone. PROM for knee flexion and extension mobility in supine/prone. STM/MFR to the distal IT band, quadriceps, peripatellar structures and popliteal musculature. Neuromuscular Re-education (NMR) x 15 minutes:  []  Kinesiotaping applied in a peripatellar \"Y\" pattern to promote lymphatic drainage. []  Neuromuscular reeducation to the VMO with use of [place] electrical stimulation in conjunction with active contraction and exercises. [x]  balance/proprioceptive exercises and activities in clinic as listed below as NMR. [x]  Verbal/tactile cueing for isometric quad contraction       Therapeutic Exercise x 30 mins:   Strengthening/Endurance/ADL function/Neuromuscular reeducation activities/exercises supervised and completed as listed below. Passive quad and hamstring flexibility exercises in supine, sidelying, and with leg off table. EXERCISE X= completed on  2022   bike random.  10' (3.5)   slantboard x   LAQ/SAQ    TKE blue   Lat steps w/foam --   HS curls --   Cups *NMR x   Balance board *NMR x   TG minisquat B/R uni Level  Tandem stance (nmr) --   Jessie w/band grn   Box step up/down #2   4way Gait purple   Sit-stand x   ABC with ball s   /120   Ecc. phonebooks x   Single leg RDL y   [place]   Walking lunge    y  Lat lunge to [employer]   y  [place]/skipping   y  . Added/Changed Exercises:  [x]  Advanced to address: [x] functional strength/ROM deficits [x] balance/proprioceptive tasks  []  Modified: [] per subjective reports [] for patient time constraints [] for clinic time constraints    Modality:  []  E-Stim: type _ x _ min     []att   []unatt   []w/ice   []w/heat  []  Ultrasound: []cont   []pulse    _ W/cm2 x _  min   []1MHz   []3MHz  []  Ice pack: post      []  Hot pack: pre    []  Other:     Patient Education: [] Review HEP    [] Progressed/Changed HEP to include:  [] positioning   [] body mechanics   [] transfers   [] heat/ice application      ROM 9-644+    ASSESSMENT    Still requires some alterations in hip/knee/trunk positioning with functional lunge positioning. Increasing ADL activity but having difficulty with higher level speed and proprioceptive tasks.     PLAN  [x]  Upgrade activities as tolerated      [x]  Continue plan of care  []  Discharge due to:  [] Other:       Casimiro Larson, PT, DPT 11/28/2022

## 2022-12-05 ENCOUNTER — OFFICE VISIT (OUTPATIENT)
Dept: ORTHOPEDIC SURGERY | Age: 52
End: 2022-12-05
Payer: COMMERCIAL

## 2022-12-05 DIAGNOSIS — M24.661 ARTHROFIBROSIS OF KNEE JOINT, RIGHT: Primary | ICD-10-CM

## 2022-12-05 DIAGNOSIS — G89.18 ACUTE POSTOPERATIVE PAIN OF RIGHT KNEE: ICD-10-CM

## 2022-12-05 DIAGNOSIS — M25.561 ACUTE POSTOPERATIVE PAIN OF RIGHT KNEE: ICD-10-CM

## 2022-12-05 DIAGNOSIS — S83.411D COMPLETE TEAR OF MCL OF KNEE, RIGHT, SUBSEQUENT ENCOUNTER: ICD-10-CM

## 2022-12-05 DIAGNOSIS — S83.511D COMPLETE TEAR OF RIGHT ACL, SUBSEQUENT ENCOUNTER: ICD-10-CM

## 2022-12-05 PROCEDURE — 97110 THERAPEUTIC EXERCISES: CPT | Performed by: PHYSICAL THERAPIST

## 2022-12-05 PROCEDURE — 97112 NEUROMUSCULAR REEDUCATION: CPT | Performed by: PHYSICAL THERAPIST

## 2022-12-05 PROCEDURE — 97140 MANUAL THERAPY 1/> REGIONS: CPT | Performed by: PHYSICAL THERAPIST

## 2022-12-05 NOTE — PROGRESS NOTES
PT DAILY TREATMENT NOTE    Patient Name: Km Mattson  Date:2022  : 1970  [x]  Patient  Verified  Payor: BLUE CROSS / Plan:  [facility] / Product Type: PPO /    Total Treatment Time (min): 60+  Referring Provider: Phillip Long DO    1. Arthrofibrosis of knee joint, right  2. Complete tear of right ACL, subsequent encounter  3. Complete tear of MCL of knee, right, subsequent encounter  4. Acute postoperative pain of right knee    SUBJECTIVE  Subjective functional status/changes:   [] No changes reported    Still complains of stiffness in the knee. Admits inconsistent ability to attend the gym outside of PT.     OBJECTIVE/TREATMENT    Manual Therapy x 15 mins:   PF/TF mobilizations to affected limb to promote improved joint mobility while supine/prone. PROM for knee flexion and extension mobility in supine/prone. STM/MFR to the distal IT band, quadriceps, peripatellar structures and popliteal musculature. Neuromuscular Re-education (NMR) x 15 minutes:  []  Kinesiotaping applied in a peripatellar \"Y\" pattern to promote lymphatic drainage. []  Neuromuscular reeducation to the VMO with use of [place] electrical stimulation in conjunction with active contraction and exercises. [x]  balance/proprioceptive exercises and activities in clinic as listed below as NMR. [x]  Verbal/tactile cueing for isometric quad contraction       Therapeutic Exercise x 30 mins:   Strengthening/Endurance/ADL function/Neuromuscular reeducation activities/exercises supervised and completed as listed below. Passive quad and hamstring flexibility exercises in supine, sidelying, and with leg off table. EXERCISE X= completed on  2022   [employer] random.  10' (3.5)   slantboard x   LAQ/SAQ    TKE blue   Lat steps w/foam --   HS curls --   Cups *NMR x   Balance board *NMR x   TG minisquat B/R uni Level    Tandem stance (nmr) --   Bridges w/band grn   Box step up/down #2   4way Gait purple Sit-stand x   ABC with ball s   /120   Ecc. phonebooks x   Single leg RDL y   [place]   Walking lunge    y  Lat lunge to [employer]   y  [place]/skipping   y  . Added/Changed Exercises:  [x]  Advanced to address: [x] functional strength/ROM deficits [x] balance/proprioceptive tasks  []  Modified: [] per subjective reports [] for patient time constraints [] for clinic time constraints    Modality:  []  E-Stim: type _ x _ min     []att   []unatt   []w/ice   []w/heat  []  Ultrasound: []cont   []pulse    _ W/cm2 x _  min   []1MHz   []3MHz  []  Ice pack: post      []  Hot pack: pre    []  Other:     Patient Education: [] Review HEP    [] Progressed/Changed HEP to include:  [] positioning   [] body mechanics   [] transfers   [] heat/ice application      ROM 5-265+    ASSESSMENT    Progressing with general ROM but still needs to work hard outside the clinic to make lasting strength/proprio gains.      PLAN  [x]  Upgrade activities as tolerated      [x]  Continue plan of care  []  Discharge due to:  [] Other:       Fatimah Plasencia, PT, DPT 12/5/2022

## 2022-12-12 ENCOUNTER — OFFICE VISIT (OUTPATIENT)
Dept: ORTHOPEDIC SURGERY | Age: 52
End: 2022-12-12
Payer: COMMERCIAL

## 2022-12-12 DIAGNOSIS — M24.661 ARTHROFIBROSIS OF KNEE JOINT, RIGHT: Primary | ICD-10-CM

## 2022-12-12 DIAGNOSIS — M25.561 ACUTE POSTOPERATIVE PAIN OF RIGHT KNEE: ICD-10-CM

## 2022-12-12 DIAGNOSIS — G89.18 ACUTE POSTOPERATIVE PAIN OF RIGHT KNEE: ICD-10-CM

## 2022-12-12 DIAGNOSIS — S83.511D COMPLETE TEAR OF RIGHT ACL, SUBSEQUENT ENCOUNTER: ICD-10-CM

## 2022-12-12 DIAGNOSIS — S83.411D COMPLETE TEAR OF MCL OF KNEE, RIGHT, SUBSEQUENT ENCOUNTER: ICD-10-CM

## 2022-12-12 NOTE — PROGRESS NOTES
PT DAILY TREATMENT NOTE    Patient Name: Drew Velazco  Date:2022  : 1970  [x]  Patient  Verified  Payor: BLUE CROSS / Plan:  [facility] / Product Type: PPO /    Total Treatment Time (min): 60+  Referring Provider: Saud Price DO    1. Arthrofibrosis of knee joint, right  2. Complete tear of right ACL, subsequent encounter  3. Complete tear of MCL of knee, right, subsequent encounter  4. Acute postoperative pain of right knee    SUBJECTIVE  Subjective functional status/changes:   [] No changes reported    Admits he has mostly just been walking for exercise outside the clinic over the past week. OBJECTIVE/TREATMENT    Manual Therapy x 15 mins:   PF/TF mobilizations to affected limb to promote improved joint mobility while supine/prone. PROM for knee flexion and extension mobility in supine/prone. STM/MFR to the distal IT band, quadriceps, peripatellar structures and popliteal musculature. Neuromuscular Re-education (NMR) x 15 minutes:  []  Kinesiotaping applied in a peripatellar \"Y\" pattern to promote lymphatic drainage. []  Neuromuscular reeducation to the VMO with use of [place] electrical stimulation in conjunction with active contraction and exercises. [x]  balance/proprioceptive exercises and activities in clinic as listed below as NMR. [x]  Verbal/tactile cueing for isometric quad contraction       Therapeutic Exercise x 30 mins:   Strengthening/Endurance/ADL function/Neuromuscular reeducation activities/exercises supervised and completed as listed below. Passive quad and hamstring flexibility exercises in supine, sidelying, and with leg off table. EXERCISE X= completed on  2022   [employer] random.  10' (3.5)   slantboard x   LAQ/SAQ 8/15   TKE blue   Lat steps w/foam --   HS curls --   Cups *NMR x   Balance board *NMR x   TG minisquat B/R uni Level    Tandem stance (nmr) --   Bridges w/band grn   Box step up/down #2   4way Gait purple   Sit-stand x ABC with ball s    1leg   Ecc. phonebooks x   Single leg RDL y   [place]   Walking lunge    y  Lat lunge to [employer]   y  [place]/skipping   y  . Added/Changed Exercises:  [x]  Advanced to address: [x] functional strength/ROM deficits [x] balance/proprioceptive tasks  []  Modified: [] per subjective reports [] for patient time constraints [] for clinic time constraints    Modality:  []  E-Stim: type _ x _ min     []att   []unatt   []w/ice   []w/heat  []  Ultrasound: []cont   []pulse    _ W/cm2 x _  min   []1MHz   []3MHz  []  Ice pack: post      []  Hot pack: pre    []  Other:     Patient Education: [] Review HEP    [] Progressed/Changed HEP to include:  [] positioning   [] body mechanics   [] transfers   [] heat/ice application      ROM 6-987+    ASSESSMENT    Patient does appear to have better technique with lunge/squatting in the clinic today but still with hesitancy and weakness during functional deep knee flexion. Needs to be diligent with strength/balance/proprio outside the clinic to maximize functional recovery and safely return to desired higher level recreational sport.      PLAN  [x]  Upgrade activities as tolerated      [x]  Continue plan of care  []  Discharge due to:  [] Other:       Heydi Gonsalves, PT, DPT 12/12/2022

## 2022-12-15 ENCOUNTER — OFFICE VISIT (OUTPATIENT)
Dept: URGENT CARE | Age: 52
End: 2022-12-15
Payer: COMMERCIAL

## 2022-12-15 VITALS
SYSTOLIC BLOOD PRESSURE: 135 MMHG | WEIGHT: 210 LBS | BODY MASS INDEX: 29.4 KG/M2 | HEART RATE: 66 BPM | TEMPERATURE: 98 F | DIASTOLIC BLOOD PRESSURE: 94 MMHG | HEIGHT: 71 IN | RESPIRATION RATE: 18 BRPM | OXYGEN SATURATION: 98 %

## 2022-12-15 DIAGNOSIS — R05.1 ACUTE COUGH: ICD-10-CM

## 2022-12-15 DIAGNOSIS — H66.91 ACUTE RIGHT OTITIS MEDIA: Primary | ICD-10-CM

## 2022-12-15 DIAGNOSIS — Z11.59 SCREENING FOR VIRAL DISEASE: ICD-10-CM

## 2022-12-15 LAB
FLUAV+FLUBV AG NOSE QL IA.RAPID: NEGATIVE
FLUAV+FLUBV AG NOSE QL IA.RAPID: NEGATIVE
SARS-COV-2 PCR, POC: NEGATIVE
VALID INTERNAL CONTROL?: YES

## 2022-12-15 PROCEDURE — 99203 OFFICE O/P NEW LOW 30 MIN: CPT | Performed by: FAMILY MEDICINE

## 2022-12-15 PROCEDURE — 87635 SARS-COV-2 COVID-19 AMP PRB: CPT | Performed by: FAMILY MEDICINE

## 2022-12-15 PROCEDURE — 87804 INFLUENZA ASSAY W/OPTIC: CPT | Performed by: FAMILY MEDICINE

## 2022-12-15 RX ORDER — CEFDINIR 300 MG/1
300 CAPSULE ORAL 2 TIMES DAILY
Qty: 20 CAPSULE | Refills: 0 | Status: SHIPPED | OUTPATIENT
Start: 2022-12-15 | End: 2022-12-25

## 2022-12-15 NOTE — PROGRESS NOTES
Theresa Lu is a 47 y.o. male who presents with cough, bilateral ear pain, ST, nasal congestion, fatigue, decreased appetite; sx started 2 days ago; sx worsening today. Took home COVID test which was negative. Denies fever, SOB, N/V/D. Took ibuprofen for ear pain without relief. The history is provided by the patient. Past Medical History:   Diagnosis Date    Arthritis     knee    Diabetes ([address]     Fatty liver     GERD (gastroesophageal reflux disease)     with certain foods    High cholesterol     Nausea & vomiting     Sleep apnea     compliant with machine        Past Surgical History:   Procedure Laterality Date    HX COLONOSCOPY      HX HERNIA REPAIR      abdominal with mesh    HX ORTHOPAEDIC Right     knee    HX WISDOM TEETH EXTRACTION           History reviewed. No pertinent family history. Social History     Socioeconomic History    Marital status: SINGLE     Spouse name: Not on file    Number of children: Not on file    Years of education: Not on file    Highest education level: Not on file   Occupational History    Not on file   Tobacco Use    Smoking status: Former    Smokeless tobacco: Never   Vaping Use    Vaping Use: Never used   Substance and Sexual Activity    Alcohol use: Yes     Alcohol/week: 7.0 standard drinks     Types: 7 Shots of liquor per week    Drug use: Never    Sexual activity: Not on file   Other Topics Concern    Not on file   Social History Narrative    Not on file     Social Determinants of Health     Financial Resource Strain: Not on file   Food Insecurity: Not on file   Transportation Needs: Not on file   Physical Activity: Not on file   Stress: Not on file   Social Connections: Not on file   Intimate Partner Violence: Not on file   Housing Stability: Not on file                ALLERGIES: Sulfa (sulfonamide antibiotics)    Review of Systems   Constitutional:  Positive for appetite change and fatigue. Negative for fever.    HENT:  Positive for congestion, ear pain and sore throat. Respiratory:  Positive for cough. Negative for shortness of breath. Gastrointestinal:  Negative for diarrhea, nausea and vomiting. Vitals:    12/15/22 1621   BP: (!) 135/94   Pulse: 66   Resp: 18   Temp: 98 °F (36.7 °C)   SpO2: 98%   Weight: 210 lb (95.3 kg)   Height: 5' 11\" (1.803 m)       Physical Exam  Vitals and nursing note reviewed. Constitutional:       General: He is not in acute distress. Appearance: He is well-developed. He is not diaphoretic. HENT:      Right Ear: Ear canal and external ear normal. Tympanic membrane is erythematous and bulging. Left Ear: Ear canal and external ear normal. Tympanic membrane is erythematous. Tympanic membrane is not bulging. Nose: Congestion present. Right Sinus: No maxillary sinus tenderness or frontal sinus tenderness. Left Sinus: No maxillary sinus tenderness or frontal sinus tenderness. Mouth/Throat:      Pharynx: Posterior oropharyngeal erythema present. No oropharyngeal exudate. Tonsils: No tonsillar abscesses. Cardiovascular:      Rate and Rhythm: Normal rate and regular rhythm. Heart sounds: Normal heart sounds. Pulmonary:      Effort: Pulmonary effort is normal. No respiratory distress. Breath sounds: Normal breath sounds. No stridor. No wheezing, rhonchi or rales. Lymphadenopathy:      Cervical: No cervical adenopathy. Neurological:      Mental Status: He is alert. Psychiatric:         Behavior: Behavior normal.         Thought Content: Thought content normal.         Judgment: Judgment normal.       MDM    ICD-10-CM ICD-9-CM   1. Acute right otitis media  H66.91 382.9   2. Acute cough  R05.1 786.2   3. Screening for viral disease  Z11.59 V73.99       Orders Placed This Encounter    AMB POC TIM INFLUENZA A/B TEST    POCT COVID-19, SARS-COV-2, PCR     Order Specific Question:   Is this test for diagnosis or screening?      Answer:   Diagnosis of ill patient     Order Specific Question:   Symptomatic for COVID-19 as defined by CDC? Answer:   Yes     Order Specific Question:   Date of Symptom Onset     Answer:   12/13/2022     Order Specific Question:   Hospitalized for COVID-19? Answer:   No     Order Specific Question:   Admitted to ICU for COVID-19? Answer:   No     Order Specific Question:   Employed in healthcare setting? Answer:   Unknown     Order Specific Question:   Resident in a congregate (group) care setting? Answer:   Unknown     Order Specific Question:   Previously tested for COVID-19? Answer:   Unknown    cefdinir (OMNICEF) 300 mg capsule     Sig: Take 1 Capsule by mouth two (2) times a day for 10 days. Dispense:  20 Capsule     Refill:  0        Start Cefdinir  OTC tylenol as directed  The patient is to follow up with PCP [facility]. If signs and symptoms become worse the pt is to go to the ER.      Results for orders placed or performed in visit on 12/15/22   AMB POC TIM INFLUENZA A/B TEST   Result Value Ref Range    VALID INTERNAL CONTROL POC Yes     Influenza A Ag POC Negative Negative    Influenza B Ag POC Negative Negative   POCT COVID-19, SARS-COV-2, PCR   Result Value Ref Range    SARS-COV-2 PCR, POC Negative Negative         Procedures

## 2022-12-15 NOTE — PATIENT INSTRUCTIONS
We will call within an hour if with positive results    Start Cefdinir    OTC tylenol as directed    MyChart Login: ANNABELLA

## 2022-12-22 ENCOUNTER — OFFICE VISIT (OUTPATIENT)
Dept: ORTHOPEDIC SURGERY | Age: 52
End: 2022-12-22
Payer: COMMERCIAL

## 2022-12-22 DIAGNOSIS — S83.511D COMPLETE TEAR OF RIGHT ACL, SUBSEQUENT ENCOUNTER: ICD-10-CM

## 2022-12-22 DIAGNOSIS — Z98.890 STATUS POST ARTHROSCOPY OF KNEE: ICD-10-CM

## 2022-12-22 DIAGNOSIS — M24.661 ARTHROFIBROSIS OF KNEE JOINT, RIGHT: Primary | ICD-10-CM

## 2022-12-22 DIAGNOSIS — S83.411D COMPLETE TEAR OF MCL OF KNEE, RIGHT, SUBSEQUENT ENCOUNTER: ICD-10-CM

## 2022-12-22 NOTE — PROGRESS NOTES
PT DAILY TREATMENT NOTE    Patient Name: Carmelina Bell  Date:2022  : 1970  [x]  Patient  Verified  Payor: BLUE CROSS / Plan:  [facility] / Product Type: PPO /    Total Treatment Time (min): 60+  Referring Provider: Shakira Gonzales DO    1. Arthrofibrosis of knee joint, right  2. Complete tear of right ACL, subsequent encounter  3. Complete tear of MCL of knee, right, subsequent encounter  4. Status post arthroscopy of knee    SUBJECTIVE  Subjective functional status/changes:   [] No changes reported    Patient states he has joined a gym and gone a few times. He is able to ride his bike for 20-30 miles without problem. OBJECTIVE/TREATMENT    Manual Therapy x 15 mins:   PF/TF mobilizations to affected limb to promote improved joint mobility while supine/prone. PROM for knee flexion and extension mobility in supine/prone. STM/MFR to the distal IT band, quadriceps, peripatellar structures and popliteal musculature. Neuromuscular Re-education (NMR) x 15 minutes:  []  Kinesiotaping applied in a peripatellar \"Y\" pattern to promote lymphatic drainage. []  Neuromuscular reeducation to the VMO with use of [place] electrical stimulation in conjunction with active contraction and exercises. [x]  balance/proprioceptive exercises and activities in clinic as listed below as NMR. [x]  Verbal/tactile cueing for isometric quad contraction       Therapeutic Exercise x 30 mins:   Strengthening/Endurance/ADL function/Neuromuscular reeducation activities/exercises supervised and completed as listed below. Passive quad and hamstring flexibility exercises in supine, sidelying, and with leg off table. EXERCISE X= completed on  2022   bike random.  10' (3.5)   slantboard x   LAQ/SAQ 8/15   TKE blue   Lat steps w/foam --   HS curls --   Cups *NMR x   Balance board *NMR x   TG minisquat R uni Level 11   Tandem stance (nmr) --   Bridges w/band --   Box step up/down #2   4way Gait purple Sit-stand x   ABC with ball x    1leg   Ecc. phonebooks x   Single leg RDL y   [place]   Walking lunges x   Lat lunge to [facility]OS[facility] x   [place]/skipping x       Added/Changed Exercises:  [x]  Advanced to address: [x] functional strength/ROM deficits [x] balance/proprioceptive tasks  []  Modified: [] per subjective reports [] for patient time constraints [] for clinic time constraints    Modality:  []  E-Stim: type _ x _ min     []att   []unatt   []w/ice   []w/heat  []  Ultrasound: []cont   []pulse    _ W/cm2 x _  min   []1MHz   []3MHz  []  Ice pack: post      []  Hot pack: pre    []  Other:     Patient Education: [] Review HEP    [] Progressed/Changed HEP to include:  [] positioning   [] body mechanics   [] transfers   [] heat/ice application      ROM 5-100+    ASSESSMENT    We discussed again importance of consistency with strengthening and exercises outside of therapy. Crepitus with PF ROM but generally doing well. Still has pain with jogging.      PLAN  [x]  Upgrade activities as tolerated      [x]  Continue plan of care  []  Discharge due to:  [] Other:       Nehemias Man, PTA 12/22/2022

## 2022-12-22 NOTE — PROGRESS NOTES
I have reviewed the notes, assessments, and/or procedures performed by Ana Reyna PTA, I concur with her/his documentation of Carmelina Bell.

## 2023-01-09 ENCOUNTER — OFFICE VISIT (OUTPATIENT)
Dept: ORTHOPEDIC SURGERY | Age: 53
End: 2023-01-09
Payer: COMMERCIAL

## 2023-01-09 DIAGNOSIS — M24.661 ARTHROFIBROSIS OF KNEE JOINT, RIGHT: Primary | ICD-10-CM

## 2023-01-09 DIAGNOSIS — S83.511D COMPLETE TEAR OF RIGHT ACL, SUBSEQUENT ENCOUNTER: ICD-10-CM

## 2023-01-09 DIAGNOSIS — G89.18 ACUTE POSTOPERATIVE PAIN OF RIGHT KNEE: ICD-10-CM

## 2023-01-09 DIAGNOSIS — M25.561 ACUTE POSTOPERATIVE PAIN OF RIGHT KNEE: ICD-10-CM

## 2023-01-09 DIAGNOSIS — S83.411D COMPLETE TEAR OF MCL OF KNEE, RIGHT, SUBSEQUENT ENCOUNTER: ICD-10-CM

## 2023-01-09 PROCEDURE — 97110 THERAPEUTIC EXERCISES: CPT | Performed by: PHYSICAL THERAPIST

## 2023-01-09 PROCEDURE — 97112 NEUROMUSCULAR REEDUCATION: CPT | Performed by: PHYSICAL THERAPIST

## 2023-01-09 PROCEDURE — 97140 MANUAL THERAPY 1/> REGIONS: CPT | Performed by: PHYSICAL THERAPIST

## 2023-01-09 NOTE — PROGRESS NOTES
PT DAILY TREATMENT NOTE    Patient Name: Elsa Median  Date:2022  : 1970  [x]  Patient  Verified  Payor: BLUE CROSS / Plan:  [facility] / Product Type: PPO /    Total Treatment Time (min): 60+  Referring Provider: Shilpi Kelley DO    1. Arthrofibrosis of knee joint, right  2. Complete tear of right ACL, subsequent encounter  3. Complete tear of MCL of knee, right, subsequent encounter  4. Acute postoperative pain of right knee    SUBJECTIVE  Subjective functional status/changes:   [] No changes reported    Patient arrives for first visit since . States he has been exercising at the gym since we have last seen him. States he has continued to ride his bike and has even progressed himself back to jogging on trails. States that his knee feels \"stiff. \"  Has questions regarding additional exercise options. OBJECTIVE/TREATMENT    Manual Therapy x 15 mins:   PF/TF mobilizations to affected limb to promote improved joint mobility while supine/prone. PROM for knee flexion and extension mobility in supine/prone. STM/MFR to the distal IT band, quadriceps, peripatellar structures and popliteal musculature. Neuromuscular Re-education (NMR) x 15 minutes:  []  Kinesiotaping applied in a peripatellar \"Y\" pattern to promote lymphatic drainage. []  Neuromuscular reeducation to the VMO with use of [place] electrical stimulation in conjunction with active contraction and exercises. [x]  balance/proprioceptive exercises and activities in clinic as listed below as NMR. [x]  Verbal/tactile cueing for isometric quad contraction       Therapeutic Exercise x 30 mins:   Strengthening/Endurance/ADL function/Neuromuscular reeducation activities/exercises supervised and completed as listed below. Passive quad and hamstring flexibility exercises in supine, sidelying, and with leg off table.         EXERCISE X= completed on  2023   bike    slantboard    LAQ 17   TKE blue   Lat steps w/foam --   HS curls --   Cups *NMR x   Balance board *NMR x   TG minisquat R uni Level 20   Tandem stance (nmr) --   Bridges w/band --   Box step up/down    4way Gait bungee   Sit-stand -   ABC with ball x    1leg   Ecc. stepdown Box 2   Single leg RDL Y 5#   [place]   Walking lunges x   Lat lunge to [facility]OS[facility] -   [place]/skipping -     Fwd/Retro jogging here in the clinic with clear hesitancy, antalgia and limited single limb stance time. Added/Changed Exercises:  [x]  Advanced to address: [x] functional strength/ROM deficits [x] balance/proprioceptive tasks  []  Modified: [] per subjective reports [] for patient time constraints [] for clinic time constraints    Modality:  []  E-Stim: type _ x _ min     []att   []unatt   []w/ice   []w/heat  []  Ultrasound: []cont   []pulse    _ W/cm2 x _  min   []1MHz   []3MHz  []  Ice pack: post      []  Hot pack: pre    []  Other:     Patient Education: [] Review HEP    [] Progressed/Changed HEP to include:  [] positioning   [] body mechanics   [] transfers   [] heat/ice application      ROM 6-066  I did review his current gym exercises which he has journaled over the past 1 to 2 weeks. Much of his time is spent with seated machine work splitting between upper and lower extremities. Has been doing some biking and jogging for cardio. I did review the need for weightbearing, balance, and single limb exercise to progress his functional activity. Significant cueing with exercise technique to include RDL's and lunges here in clinic which he may utilize at home/gym as well. ASSESSMENT    Patient has increased some of his consistency with home and gym exercise but still with clear weakness and delayed isometric quad activation. Still with strength and proprioceptive deficits during functional lunging. Still with clear antalgia with return to jogging. Skilled PT still appropriate to guide his progression back to independent exercise.     PLAN  [x]  Upgrade activities as tolerated      [x]  Continue plan of care  []  Discharge due to:  [] Other:       Deloras Baumgarten, PT, DPT 1/9/2023

## 2023-01-12 ENCOUNTER — OFFICE VISIT (OUTPATIENT)
Dept: ORTHOPEDIC SURGERY | Age: 53
End: 2023-01-12
Payer: COMMERCIAL

## 2023-01-12 DIAGNOSIS — M24.661 ARTHROFIBROSIS OF KNEE JOINT, RIGHT: Primary | ICD-10-CM

## 2023-01-12 DIAGNOSIS — S83.511D COMPLETE TEAR OF RIGHT ACL, SUBSEQUENT ENCOUNTER: ICD-10-CM

## 2023-01-12 DIAGNOSIS — M25.561 ACUTE POSTOPERATIVE PAIN OF RIGHT KNEE: ICD-10-CM

## 2023-01-12 DIAGNOSIS — S83.411D COMPLETE TEAR OF MCL OF KNEE, RIGHT, SUBSEQUENT ENCOUNTER: ICD-10-CM

## 2023-01-12 DIAGNOSIS — G89.18 ACUTE POSTOPERATIVE PAIN OF RIGHT KNEE: ICD-10-CM

## 2023-01-12 NOTE — PROGRESS NOTES
PT DAILY TREATMENT NOTE    Patient Name: Mary Bustos  Date:2022  : 1970  [x]  Patient  Verified  Payor: BLUE CROSS / Plan:  [facility] / Product Type: PPO /    Total Treatment Time (min): 60+  Referring Provider: Yamilet Chavez DO    1. Arthrofibrosis of knee joint, right  2. Complete tear of right ACL, subsequent encounter  3. Complete tear of MCL of knee, right, subsequent encounter  4. Acute postoperative pain of right knee    SUBJECTIVE  Subjective functional status/changes:   [] No changes reported    Patient reports he felt good and encouraged to have new exercises last session but admits he has not been to the gym or exercise on his own much since last session to try them at home. Knee still feels \"tight. \"    OBJECTIVE/TREATMENT    Manual Therapy x 15 mins:   PF/TF mobilizations to affected limb to promote improved joint mobility while supine/prone. PROM for knee flexion and extension mobility in supine/prone. Neuromuscular Re-education (NMR) x 15 minutes:  []  Kinesiotaping applied in a peripatellar \"Y\" pattern to promote lymphatic drainage. []  Neuromuscular reeducation to the VMO with use of [place] electrical stimulation in conjunction with active contraction and exercises. [x]  balance/proprioceptive exercises and activities in clinic as listed below as NMR. [x]  Verbal/tactile cueing for isometric quad contraction       Therapeutic Exercise x 30 mins:   Strengthening/Endurance/ADL function/Neuromuscular reeducation activities/exercises supervised and completed as listed below. Passive quad and hamstring flexibility exercises in supine, prone, and with leg off table.         EXERCISE X= completed on  2023   bike    slantboard    LAQ 17   TKE bungee   Lat steps w/foam --   HS curls --   Cups *NMR x   Balance board *NMR x   TG minisquat R uni Level 20   Tandem stance (nmr) --   Bridges w/band --   Box step up/down y   4way Gait bungee   Sit-stand -   ABC with ball x    1leg   Ecc. stepdown Box 2   Single leg RDL Y 5#   [place]   Walking lunges x   Lat lunge to [facility]OS[facility] y   [place]/skipping y     1/4 speed footwork agility drills at simulated agility ladder here in clinic today. Added/Changed Exercises:  [x]  Advanced to address: [x] functional strength/ROM deficits [x] balance/proprioceptive tasks  []  Modified: [] per subjective reports [] for patient time constraints [] for clinic time constraints    Modality:  []  E-Stim: type _ x _ min     []att   []unatt   []w/ice   []w/heat  []  Ultrasound: []cont   []pulse    _ W/cm2 x _  min   []1MHz   []3MHz  []  Ice pack: post      []  Hot pack: pre    []  Other:     Patient Education: [] Review HEP    [] Progressed/Changed HEP to include:  [] positioning   [] body mechanics   [] transfers   [] heat/ice application      ROM 8-658      ASSESSMENT    Still with clear weakness during isolated quad contraction and delay in activation. Still benefits from monitored gradual progression of exercise to return to needed higher level strength and proprioception for independent exercise.     PLAN  [x]  Upgrade activities as tolerated      [x]  Continue plan of care  []  Discharge due to:  [] Other:       No Welch, PT, DPT 1/12/2023

## 2023-01-23 ENCOUNTER — OFFICE VISIT (OUTPATIENT)
Dept: ORTHOPEDIC SURGERY | Age: 53
End: 2023-01-23
Payer: COMMERCIAL

## 2023-01-23 DIAGNOSIS — G89.18 ACUTE POSTOPERATIVE PAIN OF RIGHT KNEE: ICD-10-CM

## 2023-01-23 DIAGNOSIS — S83.411D COMPLETE TEAR OF MCL OF KNEE, RIGHT, SUBSEQUENT ENCOUNTER: ICD-10-CM

## 2023-01-23 DIAGNOSIS — M25.561 ACUTE POSTOPERATIVE PAIN OF RIGHT KNEE: ICD-10-CM

## 2023-01-23 DIAGNOSIS — S83.511D COMPLETE TEAR OF RIGHT ACL, SUBSEQUENT ENCOUNTER: ICD-10-CM

## 2023-01-23 DIAGNOSIS — M24.661 ARTHROFIBROSIS OF KNEE JOINT, RIGHT: Primary | ICD-10-CM

## 2023-01-23 PROCEDURE — 97140 MANUAL THERAPY 1/> REGIONS: CPT | Performed by: PHYSICAL THERAPIST

## 2023-01-23 PROCEDURE — 97110 THERAPEUTIC EXERCISES: CPT | Performed by: PHYSICAL THERAPIST

## 2023-01-23 PROCEDURE — 97112 NEUROMUSCULAR REEDUCATION: CPT | Performed by: PHYSICAL THERAPIST

## 2023-01-23 NOTE — PROGRESS NOTES
PT DAILY TREATMENT NOTE    Patient Name: Kennedi Lackey  Date:2022  : 1970  [x]  Patient  Verified  Payor: Misa Jose Roberto / Plan:  [facility] / Product Type: PPO /    Total Treatment Time (min): 40+  Referring Provider: Jluis Oro DO    1. Arthrofibrosis of knee joint, right  2. Complete tear of right ACL, subsequent encounter  3. Complete tear of MCL of knee, right, subsequent encounter  4. Acute postoperative pain of right knee    SUBJECTIVE  Subjective functional status/changes:   [] No changes reported    States he has continued to ride bike, brisk walk, and light jog outside the clinic. Nervous about running on uneven surfaces but otherwise feels good. Admits he needs to find time for gym but feels he is at a point where he can continue on his own. OBJECTIVE/TREATMENT    Manual Therapy x 15 mins:   PF/TF mobilizations to affected limb to promote improved joint mobility while supine/prone. PROM for knee flexion and extension mobility in supine/prone. Neuromuscular Re-education (NMR) x 15 minutes:  []  Kinesiotaping applied in a peripatellar \"Y\" pattern to promote lymphatic drainage. []  Neuromuscular reeducation to the VMO with use of [place] electrical stimulation in conjunction with active contraction and exercises. [x]  balance/proprioceptive exercises and activities in clinic as listed below as NMR. [x]  Verbal/tactile cueing for isometric quad contraction       Therapeutic Exercise x 15 mins: - shortened exercise duration as patient has unrelated plans after PT today. Strengthening/Endurance/ADL function/Neuromuscular reeducation activities/exercises supervised and completed as listed below. Passive quad and hamstring flexibility exercises in supine, prone, and with leg off table.         EXERCISE X= completed on  2023   bike    slantboard    LAQ    TKE bungee   Lat steps w/foam --   HS curls --   Cups *NMR x   Balance board *NMR x   TG minisquat R uni Level 20   Tandem stance (nmr) --   Bridges w/band --   Box step up/down y   4way Gait bungee   Sit-stand -   ABC with ball x    1leg   Ecc. stepdown Box 2   Single leg RDL Y 5#   [place]   Walking lunges    Lat lunge to BOSU y   [place]/skipping y     1/4 speed footwork agility drills at simulated agility ladder here in clinic today. Added/Changed Exercises:  [x]  Advanced to address: [x] functional strength/ROM deficits [x] balance/proprioceptive tasks  []  Modified: [] per subjective reports [] for patient time constraints [] for clinic time constraints    Modality:  []  E-Stim: type _ x _ min     []att   []unatt   []w/ice   []w/heat  []  Ultrasound: []cont   []pulse    _ W/cm2 x _  min   []1MHz   []3MHz  []  Ice pack: post      []  Hot pack: pre    []  Other:     Patient Education: [] Review HEP    [] Progressed/Changed HEP to include:  [] positioning   [] body mechanics   [] transfers   [] heat/ice application      ROM 2-057      ASSESSMENT    Patient does have some generalized weakness and lack in proprioception during higher level tasks but states at this point he feels comfortable with exercise on his own outside the clinic. 8 months postop. ROM has been consistently improved recently. States he feels ready for transition to HEP alone.      PLAN  []  Upgrade activities as tolerated      []  Continue plan of care  [x]  Discharge to HEP alone  [] Other:       Deloras Baumgarten, PT, DPT 1/23/2023

## 2023-03-09 ENCOUNTER — OFFICE VISIT (OUTPATIENT)
Dept: URGENT CARE | Age: 53
End: 2023-03-09
Payer: COMMERCIAL

## 2023-03-09 VITALS
OXYGEN SATURATION: 98 % | HEART RATE: 69 BPM | RESPIRATION RATE: 18 BRPM | WEIGHT: 210 LBS | HEIGHT: 71 IN | DIASTOLIC BLOOD PRESSURE: 78 MMHG | BODY MASS INDEX: 29.4 KG/M2 | SYSTOLIC BLOOD PRESSURE: 133 MMHG

## 2023-03-09 DIAGNOSIS — J01.00 ACUTE NON-RECURRENT MAXILLARY SINUSITIS: ICD-10-CM

## 2023-03-09 DIAGNOSIS — R06.2 WHEEZING: ICD-10-CM

## 2023-03-09 DIAGNOSIS — R05.1 ACUTE COUGH: Primary | ICD-10-CM

## 2023-03-09 LAB
FLUAV+FLUBV AG NOSE QL IA.RAPID: NEGATIVE
FLUAV+FLUBV AG NOSE QL IA.RAPID: NEGATIVE
LOT NUMBER POC: NORMAL
S PYO AG THROAT QL: NEGATIVE
SARS-COV-2 PCR, POC: NEGATIVE
VALID INTERNAL CONTROL?: NORMAL
VALID INTERNAL CONTROL?: YES
VALID INTERNAL CONTROL?: YES

## 2023-03-09 PROCEDURE — 87804 INFLUENZA ASSAY W/OPTIC: CPT | Performed by: NURSE PRACTITIONER

## 2023-03-09 PROCEDURE — 87635 SARS-COV-2 COVID-19 AMP PRB: CPT | Performed by: NURSE PRACTITIONER

## 2023-03-09 PROCEDURE — 99213 OFFICE O/P EST LOW 20 MIN: CPT | Performed by: NURSE PRACTITIONER

## 2023-03-09 PROCEDURE — 87880 STREP A ASSAY W/OPTIC: CPT | Performed by: NURSE PRACTITIONER

## 2023-03-09 RX ORDER — CODEINE PHOSPHATE AND GUAIFENESIN 10; 100 MG/5ML; MG/5ML
5 SOLUTION ORAL
Qty: 100 ML | Refills: 0 | Status: SHIPPED | OUTPATIENT
Start: 2023-03-09 | End: 2023-03-14

## 2023-03-09 RX ORDER — ALBUTEROL SULFATE 90 UG/1
2 AEROSOL, METERED RESPIRATORY (INHALATION)
Qty: 1 EACH | Refills: 0 | Status: SHIPPED | OUTPATIENT
Start: 2023-03-09 | End: 2023-04-08

## 2023-03-09 RX ORDER — AMOXICILLIN AND CLAVULANATE POTASSIUM 875; 125 MG/1; MG/1
1 TABLET, FILM COATED ORAL 2 TIMES DAILY
Qty: 14 TABLET | Refills: 0 | Status: SHIPPED | OUTPATIENT
Start: 2023-03-09 | End: 2023-03-16

## 2023-03-09 RX ORDER — METFORMIN HYDROCHLORIDE 1000 MG/1
TABLET ORAL
COMMUNITY

## 2023-03-09 RX ORDER — BENZONATATE 200 MG/1
200 CAPSULE ORAL
Qty: 30 CAPSULE | Refills: 0 | Status: SHIPPED | OUTPATIENT
Start: 2023-03-09 | End: 2023-03-19

## 2023-03-09 NOTE — PATIENT INSTRUCTIONS
Results for orders placed or performed in visit on 03/09/23   AMB POC STREP A DNA, AMP PROBE   Result Value Ref Range    VALID INTERNAL CONTROL POC Yes     Group A Strep Ag Negative Negative   AMB POC INFLUENZA A  AND B REAL-TIME RT-PCR   Result Value Ref Range    VALID INTERNAL CONTROL POC Yes     Influenza A Ag POC Negative Negative    Influenza B Ag POC Negative Negative   POCT COVID-19, SARS-COV-2, PCR   Result Value Ref Range    SARS-COV-2 PCR, POC Negative Negative    VALID INTERNAL CONTROL POC      LOT NUMBER           1. Acute non-recurrent maxillary sinusitis  Rest, increased fluids. Medication: Augmentin 875/125mg 2x daily for 7 days   F/U: as needed for new or worsening symptoms     2. Wheezing  Albuterol inhaler 1-2 puff every 4-6 hours as needed.     3. Acute cough  Guaifenesin-Codeine as needed 4x daily

## 2023-03-09 NOTE — PROGRESS NOTES
The history is provided by the patient. Cough  The history is provided by the Patient. This is a new problem. The current episode started more than 2 days ago (5 days). The problem occurs constantly. The problem has been gradually worsening. The cough is Productive of sputum. There has been no fever. Associated symptoms include headaches, rhinorrhea and sore throat. Pertinent negatives include no chest pain, no shortness of breath and no wheezing. Associated symptoms comments: Nasal congestion. Treatments tried: NSAIDs. The treatment provided mild relief. He is not a smoker. His past medical history does not include bronchitis, pneumonia or asthma. Past medical history comments: DM II, seasonal allergies. Home covid test negative. Past Medical History:   Diagnosis Date    Arthritis     knee    Diabetes ([address]     Fatty liver     GERD (gastroesophageal reflux disease)     with certain foods    High cholesterol     Nausea & vomiting     Sleep apnea     compliant with machine        Past Surgical History:   Procedure Laterality Date    HX COLONOSCOPY      HX HERNIA REPAIR      abdominal with mesh    HX ORTHOPAEDIC Right     knee    HX WISDOM TEETH EXTRACTION           History reviewed. No pertinent family history. Social History     Socioeconomic History    Marital status: SINGLE     Spouse name: Not on file    Number of children: Not on file    Years of education: Not on file    Highest education level: Not on file   Occupational History    Not on file   Tobacco Use    Smoking status: Former    Smokeless tobacco: Never   Vaping Use    Vaping Use: Never used   Substance and Sexual Activity    Alcohol use:  Yes     Alcohol/week: 7.0 standard drinks     Types: 7 Shots of liquor per week    Drug use: Never    Sexual activity: Not on file   Other Topics Concern    Not on file   Social History Narrative    Not on file     Social Determinants of Health     Financial Resource Strain: Not on file   Food Insecurity: Not on file   Transportation Needs: Not on file   Physical Activity: Not on file   Stress: Not on file   Social Connections: Not on file   Intimate Partner Violence: Not on file   Housing Stability: Not on file                ALLERGIES: Sulfa (sulfonamide antibiotics)    Review of Systems   Constitutional:  Positive for fatigue. Negative for activity change and appetite change. HENT:  Positive for congestion, postnasal drip, rhinorrhea and sore throat. Negative for sinus pressure and sinus pain. Respiratory:  Positive for cough. Negative for chest tightness, shortness of breath and wheezing. Cardiovascular:  Negative for chest pain and palpitations. Gastrointestinal:  Negative for abdominal pain. Neurological:  Positive for headaches. Negative for dizziness, weakness and light-headedness. Vitals:    03/09/23 0932   BP: 133/78   Pulse: 69   Resp: 18   SpO2: 98%   Weight: 210 lb (95.3 kg)   Height: 5' 11\" (1.803 m)       Physical Exam  Constitutional:       Appearance: Normal appearance. HENT:      Right Ear: A middle ear effusion is present. Tympanic membrane is erythematous and bulging. Nose:      Right Sinus: Maxillary sinus tenderness present. No frontal sinus tenderness. Left Sinus: Maxillary sinus tenderness present. No frontal sinus tenderness. Mouth/Throat:      Pharynx: No oropharyngeal exudate or posterior oropharyngeal erythema. Tonsils: No tonsillar exudate. Eyes:      Pupils: Pupils are equal, round, and reactive to light. Cardiovascular:      Rate and Rhythm: Normal rate and regular rhythm. Heart sounds: Normal heart sounds. Pulmonary:      Effort: Pulmonary effort is normal.      Breath sounds: Examination of the right-upper field reveals wheezing. Examination of the left-upper field reveals wheezing. Examination of the right-middle field reveals wheezing. Examination of the left-middle field reveals wheezing. Wheezing present. No rhonchi or rales.    Chest: Chest wall: No tenderness. Lymphadenopathy:      Cervical: No cervical adenopathy. Neurological:      Mental Status: He is alert. Psychiatric:         Mood and Affect: Mood normal.         Behavior: Behavior normal.       MDM     Differential Diagnosis; Clinical Impression; Plan:     (R05.1) Acute cough  (primary encounter diagnosis)  (J01.00) Acute non-recurrent maxillary sinusitis  (R06.2) Wheezing        Procedures    Results for orders placed or performed in visit on 03/09/23   AMB POC STREP A DNA, AMP PROBE   Result Value Ref Range    VALID INTERNAL CONTROL POC Yes     Group A Strep Ag Negative Negative   AMB POC INFLUENZA A  AND B REAL-TIME RT-PCR   Result Value Ref Range    VALID INTERNAL CONTROL POC Yes     Influenza A Ag POC Negative Negative    Influenza B Ag POC Negative Negative   POCT COVID-19, SARS-COV-2, PCR   Result Value Ref Range    SARS-COV-2 PCR, POC Negative Negative    VALID INTERNAL CONTROL POC      LOT NUMBER           1. Acute non-recurrent maxillary sinusitis  Rest, increased fluids. Medication: Augmentin 875/125mg 2x daily for 7 days   F/U: as needed for new or worsening symptoms     2. Wheezing  Albuterol inhaler 1-2 puff every 4-6 hours as needed.     3. Acute cough  Guaifenesin-Codeine as needed 4x daily    - AMB POC STREP A DNA, AMP PROBE  - AMB POC INFLUENZA A  AND B REAL-TIME RT-PCR  - POCT COVID-19, SARS-COV-2, PCR

## 2024-02-13 ENCOUNTER — OFFICE VISIT (OUTPATIENT)
Age: 54
End: 2024-02-13

## 2024-02-13 VITALS
BODY MASS INDEX: 26.22 KG/M2 | TEMPERATURE: 98.6 F | HEART RATE: 66 BPM | RESPIRATION RATE: 18 BRPM | DIASTOLIC BLOOD PRESSURE: 77 MMHG | OXYGEN SATURATION: 98 % | SYSTOLIC BLOOD PRESSURE: 145 MMHG | WEIGHT: 188 LBS

## 2024-02-13 DIAGNOSIS — J20.9 BRONCHOSPASM WITH BRONCHITIS, ACUTE: ICD-10-CM

## 2024-02-13 DIAGNOSIS — R05.9 COUGH, UNSPECIFIED TYPE: Primary | ICD-10-CM

## 2024-02-13 RX ORDER — AZITHROMYCIN 250 MG/1
250 TABLET, FILM COATED ORAL SEE ADMIN INSTRUCTIONS
Qty: 6 TABLET | Refills: 0 | Status: SHIPPED | OUTPATIENT
Start: 2024-02-13 | End: 2024-02-18

## 2024-02-13 RX ORDER — DEXTROMETHORPHAN HYDROBROMIDE AND PROMETHAZINE HYDROCHLORIDE 15; 6.25 MG/5ML; MG/5ML
5 SYRUP ORAL 4 TIMES DAILY PRN
Qty: 120 ML | Refills: 0 | Status: SHIPPED | OUTPATIENT
Start: 2024-02-13

## 2024-02-13 RX ORDER — BENZONATATE 200 MG/1
200 CAPSULE ORAL 3 TIMES DAILY PRN
Qty: 30 CAPSULE | Refills: 0 | Status: SHIPPED | OUTPATIENT
Start: 2024-02-13

## 2024-02-13 RX ORDER — PREDNISONE 50 MG/1
50 TABLET ORAL DAILY
Qty: 5 TABLET | Refills: 0 | Status: SHIPPED | OUTPATIENT
Start: 2024-02-13 | End: 2024-02-18

## 2024-02-13 RX ORDER — DEXAMETHASONE SODIUM PHOSPHATE 10 MG/ML
10 INJECTION INTRAMUSCULAR; INTRAVENOUS ONCE
Qty: 1 ML | Refills: 0 | COMMUNITY
Start: 2024-02-13 | End: 2024-02-13 | Stop reason: CLARIF

## 2024-02-13 RX ORDER — DEXAMETHASONE SODIUM PHOSPHATE 10 MG/ML
10 INJECTION, SOLUTION INTRAMUSCULAR; INTRAVENOUS ONCE
Status: COMPLETED | OUTPATIENT
Start: 2024-02-13 | End: 2024-02-13

## 2024-02-13 RX ORDER — ALBUTEROL SULFATE 90 UG/1
2 AEROSOL, METERED RESPIRATORY (INHALATION) 4 TIMES DAILY PRN
Qty: 18 G | Refills: 0 | Status: SHIPPED | OUTPATIENT
Start: 2024-02-13

## 2024-02-13 RX ADMIN — DEXAMETHASONE SODIUM PHOSPHATE 10 MG: 10 INJECTION, SOLUTION INTRAMUSCULAR; INTRAVENOUS at 18:05

## 2024-02-13 NOTE — PROGRESS NOTES
comparison.    Findings: Cardiomediastinal silhouette is within normal limits. Lungs are clear  bilaterally. Pleural spaces are normal. Osseous structures are intact.      Impression    No acute cardiopulmonary disease.        The patients condition was discussed with the patient and they understand.  The patient is to follow up with primary care doctor.  If signs and symptoms become worse the pt is to go to the ER. The patient is to take medications as prescribed.

## 2024-02-13 NOTE — PATIENT INSTRUCTIONS
Use Maalox suspension 30 cc after each meal and bed time     If night cough net better use Pepcid 40 mg at be time

## 2024-09-10 ENCOUNTER — OFFICE VISIT (OUTPATIENT)
Age: 54
End: 2024-09-10

## 2024-09-10 VITALS
TEMPERATURE: 97.9 F | DIASTOLIC BLOOD PRESSURE: 76 MMHG | BODY MASS INDEX: 25.24 KG/M2 | HEART RATE: 73 BPM | RESPIRATION RATE: 16 BRPM | WEIGHT: 181 LBS | SYSTOLIC BLOOD PRESSURE: 108 MMHG | OXYGEN SATURATION: 97 %

## 2024-09-10 DIAGNOSIS — J06.9 VIRAL URI WITH COUGH: Primary | ICD-10-CM

## 2024-09-10 DIAGNOSIS — R53.83 OTHER FATIGUE: ICD-10-CM

## 2024-09-10 LAB
Lab: NORMAL
PERFORMING INSTRUMENT: NORMAL
QC PASS/FAIL: NORMAL
SARS-COV-2, POC: NORMAL

## 2024-09-10 RX ORDER — DEXTROMETHORPHAN HYDROBROMIDE AND PROMETHAZINE HYDROCHLORIDE 15; 6.25 MG/5ML; MG/5ML
5 SYRUP ORAL 4 TIMES DAILY PRN
Qty: 118 ML | Refills: 0 | Status: SHIPPED | OUTPATIENT
Start: 2024-09-10 | End: 2024-09-17

## 2024-09-10 RX ORDER — BENZONATATE 200 MG/1
200 CAPSULE ORAL 3 TIMES DAILY PRN
Qty: 21 CAPSULE | Refills: 0 | Status: SHIPPED | OUTPATIENT
Start: 2024-09-10 | End: 2024-09-17

## 2025-01-09 ENCOUNTER — OFFICE VISIT (OUTPATIENT)
Age: 55
End: 2025-01-09

## 2025-01-09 VITALS
RESPIRATION RATE: 18 BRPM | DIASTOLIC BLOOD PRESSURE: 81 MMHG | WEIGHT: 181 LBS | BODY MASS INDEX: 25.24 KG/M2 | SYSTOLIC BLOOD PRESSURE: 139 MMHG | HEART RATE: 71 BPM | TEMPERATURE: 98.4 F | OXYGEN SATURATION: 97 %

## 2025-01-09 DIAGNOSIS — J06.9 VIRAL UPPER RESPIRATORY TRACT INFECTION WITH COUGH: Primary | ICD-10-CM

## 2025-01-09 RX ORDER — CETIRIZINE HYDROCHLORIDE 10 MG/1
10 TABLET ORAL DAILY
Qty: 30 TABLET | Refills: 0 | Status: SHIPPED | OUTPATIENT
Start: 2025-01-09

## 2025-01-09 RX ORDER — FLUTICASONE PROPIONATE 50 MCG
1 SPRAY, SUSPENSION (ML) NASAL DAILY
Qty: 16 G | Refills: 0 | Status: SHIPPED | OUTPATIENT
Start: 2025-01-09

## 2025-01-09 NOTE — PROGRESS NOTES
2025   Ziggy Huber (: 1970) is a 54 y.o. male, Established patient, here for evaluation of the following chief complaint(s):  Cold Symptoms (Pt present for cold like sxs starting Tuesday. Sx sore throat runny nose coughing )     ASSESSMENT/PLAN:  Below is the assessment and plan developed based on review of pertinent history, physical exam, labs, studies, and medications.  Assessment & Plan  Viral upper respiratory tract infection with cough       Orders:    fluticasone (FLONASE) 50 MCG/ACT nasal spray; 1 spray by Each Nostril route daily    cetirizine (ZYRTEC) 10 MG tablet; Take 1 tablet by mouth daily  Exam and history consistent with a viral upper respiratory infection with cough.  -Increase fluid intake to maintain hydration and to help fight the infection  -Tylenol for fevers  -Mucinex Fastmax, Tylenol severe cold and flu, or DayQuil for symptomatic relief and decongestion  -1 tablespoon of honey or mixed with hot tea 30 minutes before sleep to help with cough at night  -Steam inhalation, warm compresses, humidifier, and warm soup can also help  -Flonase 1 squirt each nostril once a day for at least the next 2 weeks  -Zyrtec once a day for at least next 2 weeks  -Please follow-up with your PCP in the next 2 to 4 weeks    If symptoms persist or worsen, please contact PCP and/or return to urgent care.     Handout given with care instructions  2. OTC for symptom management. Increase fluid intake, ensure adequate nutritional intake.  3. Follow up with PCP as needed.  4. Go to ED with development of any acute symptoms.     Follow up:  No follow-ups on file.  Follow up immediately for any new, worsening or changes or if symptoms are not improving over the next 5-7 days.     SUBJECTIVE/OBJECTIVE:  HPI   Mr Huber presents with concern for sore throat, runny nose, and cough since Monday. He states he is not sure if he had a fever this time as he didn't measure for one.  Otherwise, he denies any current

## 2025-01-09 NOTE — PATIENT INSTRUCTIONS
Exam and history consistent with a viral upper respiratory infection with cough.  -Increase fluid intake to maintain hydration and to help fight the infection  -Tylenol for fevers  -Mucinex Fastmax, Tylenol severe cold and flu, or DayQuil for symptomatic relief and decongestion  -1 tablespoon of honey or mixed with hot tea 30 minutes before sleep to help with cough at night  -Steam inhalation, warm compresses, humidifier, and warm soup can also help  -Flonase 1 squirt each nostril once a day for at least the next 2 weeks  -Zyrtec once a day for at least next 2 weeks  -Please follow-up with your PCP in the next 2 to 4 weeks    If symptoms persist or worsen, please contact PCP and/or return to urgent care.

## 2025-04-18 ENCOUNTER — TELEPHONE (OUTPATIENT)
Age: 55
End: 2025-04-18

## 2025-04-18 NOTE — TELEPHONE ENCOUNTER
Called and lm to schedule appt with Dr. Main      Np: lipoma on arm, [place], [facility] [phone],notes in folder

## 2025-04-22 ENCOUNTER — OFFICE VISIT (OUTPATIENT)
Age: 55
End: 2025-04-22
Payer: COMMERCIAL

## 2025-04-22 VITALS
HEART RATE: 66 BPM | SYSTOLIC BLOOD PRESSURE: 120 MMHG | OXYGEN SATURATION: 94 % | WEIGHT: 196 LBS | DIASTOLIC BLOOD PRESSURE: 76 MMHG | HEIGHT: 71 IN | BODY MASS INDEX: 27.44 KG/M2 | TEMPERATURE: 97.5 F

## 2025-04-22 DIAGNOSIS — M79.89 SOFT TISSUE MASS: ICD-10-CM

## 2025-04-22 DIAGNOSIS — K42.9 UMBILICAL HERNIA WITHOUT OBSTRUCTION AND WITHOUT GANGRENE: Primary | ICD-10-CM

## 2025-04-22 PROCEDURE — 99204 OFFICE O/P NEW MOD 45 MIN: CPT | Performed by: SURGERY

## 2025-04-22 RX ORDER — SEMAGLUTIDE 2.68 MG/ML
2 INJECTION, SOLUTION SUBCUTANEOUS
COMMUNITY
Start: 2025-04-16

## 2025-04-22 RX ORDER — EMPAGLIFLOZIN 25 MG/1
25 TABLET, FILM COATED ORAL DAILY
COMMUNITY
Start: 2025-04-17

## 2025-04-22 RX ORDER — DAPAGLIFLOZIN 10 MG/1
10 TABLET, FILM COATED ORAL
COMMUNITY
Start: 2025-03-25 | End: 2025-06-23

## 2025-04-22 ASSESSMENT — PATIENT HEALTH QUESTIONNAIRE - PHQ9
SUM OF ALL RESPONSES TO PHQ QUESTIONS 1-9: 0
1. LITTLE INTEREST OR PLEASURE IN DOING THINGS: NOT AT ALL
2. FEELING DOWN, DEPRESSED OR HOPELESS: NOT AT ALL
SUM OF ALL RESPONSES TO PHQ QUESTIONS 1-9: 0

## 2025-04-22 NOTE — PROGRESS NOTES
The patient (or guardian, if applicable) and other individuals in attendance with the patient were advised that Artificial Intelligence will be utilized during this visit to record and process the conversation to generate a clinical note. The patient (or guardian, if applicable) and other individuals in attendance at the appointment consented to the use of AI, including the recording. Other portions were at least partially completed with Dragon, the computer voice recognition software.  Quite often unanticipated grammatical, syntax, homophones, and other interpretive errors are inadvertently transcribed by the software.  Please disregard these errors.  Please excuse any errors that have escaped final proofreading.      Encounter Date: 4/22/2025  History and Physical    Referring provider:  HEATHER Montoya   PCP:  Brandie Artis PA  From:  Tigre Main MD  Thank you.    Assessment & Plan  1. Recurrent umbilical hernia.  The presence of a recurrent umbilical hernia was confirmed through physical examination and ultrasound. The hernia is not dangerous but causes discomfort. A surgical intervention involving a small incision to remove the fatty tissue and place a few stitches in the strength layer was discussed. This procedure will be performed in the operating room under general anesthesia. He was advised to discontinue Ozempic one week prior to the surgery due to its potential to slow stomach emptying and increase the risk of aspiration during intubation. Jardiance can be continued as it does not pose the same risk.    2. Lipomas.  Multiple lipomas were identified, including on the left dorsal forearm, right volar forearm, and right medial upper arm. These will be surgically removed during the same procedure as the hernia repair. The patient was informed that the lipomas would be marked and removed in the operating room.    3. Skin tag.  A skin tag located on the upper thigh was noted. This will be

## 2025-04-22 NOTE — PROGRESS NOTES
Identified pt with two pt identifiers (name and ). Reviewed chart in preparation for visit and have obtained necessary documentation.    Ziggy Huber is a 54 y.o. male  Chief Complaint   Patient presents with    Skin Problem     lipoma on arm, [place]     /76 (BP Site: Right Upper Arm, Patient Position: Sitting, BP Cuff Size: Large Adult)   Pulse 66   Temp 97.5 °F (36.4 °C) (Temporal)   Ht 1.803 m (5' 11\")   Wt 88.9 kg (196 lb)   SpO2 94%   BMI 27.34 kg/m²     1. Have you been to the ER, urgent care clinic since your last visit?  Hospitalized since your last visit?no    2. Have you seen or consulted any other health care providers outside of the [facility] since your last visit?  Include any pap smears or colon screening. no

## 2025-07-02 NOTE — PROGRESS NOTES
PT DAILY TREATMENT NOTE    Patient Name: Margaret Lindsey  Date:2022  : 1970  [x]  Patient  Verified  Payor: Bettye Haines / Plan:  [facility] / Product Type: PPO /    Total Treatment Time (min): 60+  1:1 Treatment Time ( [address] only):    Referring Provider: Wilmer Pearson, DO    1. Complete tear of right ACL, subsequent encounter  2. Complete tear of MCL of knee, right, subsequent encounter  3. Acute postoperative pain of right knee  4. Status post arthroscopy of knee      SUBJECTIVE  Subjective functional status/changes:   [] No changes reported    Patient reports his knee is still sore but he feels it is moving a little better. OBJECTIVE/TREATMENT    Manual Therapy x 15 mins:   PF/TF mobilizations to affected limb to promote improved joint mobility. PROM for knee flexion and extension mobility. STM/MFR to the distal IT band, quadriceps, peripatellar structures and popliteal musculature. Neuromuscular Re-education (NMR) x 15 minutes:  []  Kinesiotaping applied in a peripatellar \"Y\" pattern to promote lymphatic drainage. []  Neuromuscular reeducation to the VMO with use of [place] electrical stimulation in conjunction with active contraction and exercises. [x]  balance/proprioceptive exercises and activities in clinic as listed below as NMR. [x]  Verbal/tactile cueing for isometric quad contraction    Therapeutic Exercise x 30 mins:   Strengthening/Endurance/ADL function/Neuromuscular reeducation activities/exercises supervised and completed as listed below. Passive quad and hamstring flexibility exercises in supine and with leg off table. Verbal cueing given for correct exercise performance. EXERCISE X= completed on  2022   bike Man.  10' (1)   slantboard x   LAQ/SAQ 3/6   TKE green   Lat steps w/foam --   HS curls --   Cups *NMR x   Balance board *NMR x   TG minisquat B/R uni Level 18/11   Tandem stance (nmr) --   Bridges w/band grn   Box step up/down #2   AP Gait green Sit-stand x   ABC with ball s                     ROM 0-95 at end of treatment. Added/Changed Exercises:  []  Advanced to address: [] functional strength/ROM deficits [] balance/proprioceptive tasks  []  Modified: [] per subjective reports [] for patient time constraints [] for clinic time constraints    Modality:  []  E-Stim: type _ x _ min     []att   []unatt   []w/ice   []w/heat  []  Ultrasound: []cont   []pulse    _ W/cm2 x _  min   []1MHz   []3MHz  []  Ice pack: post      []  Hot pack: pre    []  Other:     Patient Education: [] Review HEP    [] Progressed/Changed HEP to include:  [] positioning   [] body mechanics   [] transfers   [] heat/ice application      ASSESSMENT    Still very tight into knee flexion but he has better mobility through available ROM. Quad strength improving for step-up activities but still with clear lack in eccentric quad control.      PLAN  [x]  Upgrade activities as tolerated      [x]  Continue plan of care  []  Discharge due to:  [] Other:       Ino Fernando, PTA 8/8/2022 energy drinks/caffeine

## (undated) DEVICE — GLOVE ORANGE PI 7 1/2   MSG9075

## (undated) DEVICE — INTENDED FOR TISSUE SEPARATION, AND OTHER PROCEDURES THAT REQUIRE A SHARP SURGICAL BLADE TO PUNCTURE OR CUT.: Brand: BARD-PARKER ® CARBON RIB-BACK BLADES

## (undated) DEVICE — SUTURE MCRYL SZ 4-0 L27IN ABSRB UD L19MM PS-2 1/2 CIR PRIM Y426H

## (undated) DEVICE — SOLUTION IRRIG 3000ML LAC RINGERS ARTHROMTC PLAS CONT

## (undated) DEVICE — NEEDLE SUT PASS FOR ARTHSCP SCORPION

## (undated) DEVICE — ROCKER SWITCH PENCIL BLADE ELECTRODE, HOLSTER: Brand: EDGE

## (undated) DEVICE — DYONICS 25 INFLOW TUBE SET, 3 PER BOX

## (undated) DEVICE — Device

## (undated) DEVICE — SUT ETHLN 3-0 18IN PS2 BLK --

## (undated) DEVICE — KNEE ARTHROSCOPY-MRMCASU: Brand: MEDLINE INDUSTRIES, INC.

## (undated) DEVICE — SUTURE FIBERWIRE SZ 2 W/ TAPERED NEEDLE BLUE L38IN NONABSORB BLU L26.5MM 1/2 CIRCLE AR7200

## (undated) DEVICE — NEEDLE SUT SZ 4 MAYO CATGUT 1/2 CIR TAPR PNT DISP

## (undated) DEVICE — 4.5 MM INCISOR PLUS STRAIGHT                                    BLADES, POWER/EP-1, VIOLET, PACKAGED                                    6 PER BOX, STERILE

## (undated) DEVICE — SPONGE LAP 18X18IN STRL -- 5/PK

## (undated) DEVICE — BLADE SCALPEL STERILE NO 10

## (undated) DEVICE — ZIMMER® STERILE DISPOSABLE TOURNIQUET CUFF WITH PROTECTIVE SLEEVE AND PLC, DUAL PORT, SINGLE BLADDER, 34 IN. (86 CM)

## (undated) DEVICE — NOTCHBLASTER ABR EP-1 5.5 DSPL: Brand: DYONICS / NOTCHBLASTER

## (undated) DEVICE — SUTURE VCRL SZ 2-0 L27IN ABSRB UD L26MM SH 1/2 CIR J417H